# Patient Record
Sex: MALE | Race: WHITE | NOT HISPANIC OR LATINO | Employment: FULL TIME | URBAN - METROPOLITAN AREA
[De-identification: names, ages, dates, MRNs, and addresses within clinical notes are randomized per-mention and may not be internally consistent; named-entity substitution may affect disease eponyms.]

---

## 2017-01-05 ENCOUNTER — TELEPHONE (OUTPATIENT)
Dept: FAMILY MEDICINE | Facility: OTHER | Age: 62
End: 2017-01-05

## 2017-01-05 DIAGNOSIS — F41.9 ANXIETY: Primary | ICD-10-CM

## 2017-01-05 RX ORDER — ALPRAZOLAM 0.5 MG
0.5 TABLET ORAL
Qty: 30 TABLET | Refills: 5 | Status: SHIPPED | OUTPATIENT
Start: 2017-01-05 | End: 2017-04-28

## 2017-01-05 NOTE — TELEPHONE ENCOUNTER
Reason for call:  Medication  Reason for Call:  Medication or medication refill:    Do you use a [facility]?  Name of the pharmacy and phone number for the current request:  [facility] [address] in [place]    Name of the medication requested: ALPRAZolam (XANAX) 0.5 MG tablet    Other request: patient will be out today    Can we leave a detailed message on this number? YES    Phone number patient can be reached at: Home number on file [phone] (home)    Best Time: any    Call taken on 1/5/2017 at 8:33 AM by Sharon Esposito

## 2017-01-05 NOTE — TELEPHONE ENCOUNTER
xanax      Last Written Prescription Date:  7/5/16  Last Fill Quantity: 30,   # refills: 5  Last Office Visit with [facility], [facility] or  Health prescribing provider: 7/5/16  Future Office visit:       Routing refill request to provider for review/approval because:  Drug not on the [facility], [facility] or  Health refill protocol or controlled substance

## 2017-04-27 ENCOUNTER — TELEPHONE (OUTPATIENT)
Dept: FAMILY MEDICINE | Facility: OTHER | Age: 62
End: 2017-04-27

## 2017-04-27 DIAGNOSIS — F41.9 ANXIETY: ICD-10-CM

## 2017-04-27 NOTE — TELEPHONE ENCOUNTER
Alprazolam      Last Written Prescription Date:  01/05/17  Last Fill Quantity: 30,   # refills: 5  Last Office Visit with [facility] or [facility] prescribing provider: 07/05/16  Future Office visit:    Next 5 appointments (look out 90 days)     Jun 19, 2017  9:45 AM CDT   [facility]    [address] [address] [phone]                   Routing refill request to provider for review/approval because:  Drug not on the [facility], [facility] or [facility] refill protocol or controlled substance

## 2017-04-27 NOTE — TELEPHONE ENCOUNTER
Reason for call:  Medication  Reason for Call:  Medication or medication refill:    Do you use a [facility]?  Name of the pharmacy and phone number for the current request:  [facility]    Name of the medication requested: ALPRAZolam (XANAX) 0.5 MG tablet    Other request: Jorgito needs a refill on this medication.  He is taking more then one a day since his last brain surgery.  He is feeling more anxious since that surgery.  He did make appointment with you on 6/19/17.      Can we leave a detailed message on this number? YES    Phone number patient can be reached at: Home number on file [phone] (home)    Best Time: any    Call taken on 4/27/2017 at 3:23 PM by Sharon Esposito

## 2017-04-28 RX ORDER — ALPRAZOLAM 0.5 MG
0.5 TABLET ORAL
Qty: 30 TABLET | Refills: 0 | Status: SHIPPED | OUTPATIENT
Start: 2017-04-28 | End: 2017-06-19

## 2017-04-28 NOTE — TELEPHONE ENCOUNTER
Okay to leave message so informed patient that there is a refill ready for him at his pharmacy. (30 tablets) The pharmacy still had 2 refills for this patient, so they are just using them.   Mike Colvin, CMA

## 2017-06-12 NOTE — PROGRESS NOTES
SUBJECTIVE:                                                    Jorgito Serrano is a 61 year old male who presents to clinic today for the following health issues:      Patient wanting to refill on Xanax and look at upping the dose.  He takes a half a pill about noon and he takes 1 pill at bedtime. Ever since his brain surgery he has been hypersensitive to noise and movement of others.  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  He is very jumpy if somebody walks too close to him . The half a pill works very well.      He is still under the care of neurology at [facility] for his brain tumor.  He has annual brain and spinal column scans. There has been no change. He still has 5-10 seizures per day but this is a great improvement from what it was. His Keppra makes him a bit foggy in the head. His neurologist did not want to increase the dosage or change meds as he has been doing well.    Hyperlipidemia Follow-Up      Rate your low fat/cholesterol diet?: good    Taking statin?  Yes, no muscle aches from statin    Other lipid medications/supplements?:  none       Problem list and histories reviewed & adjusted, as indicated.  Additional history: as documented    BP Readings from Last 3 Encounters:   06/19/17 120/70   07/05/16 100/64   02/02/15 128/90    Wt Readings from Last 3 Encounters:   06/19/17 198 lb 9.6 oz (90.1 kg)   07/05/16 185 lb (83.9 kg)   02/02/15 192 lb 9.6 oz (87.4 kg)                  Labs reviewed in EPIC    Reviewed and updated as needed this visit by clinical staff       Reviewed and updated as needed this visit by Provider         ROS:  C: NEGATIVE for fever, chills, change in weight  E/M: NEGATIVE for ear, mouth and throat problems  R: NEGATIVE for significant cough or SOB  CV: NEGATIVE for chest pain, palpitations or peripheral edema  GI: NEGATIVE for nausea, abdominal pain, heartburn, or change in bowel habits  NEURO: As above  PSYCHIATRIC: NEGATIVE for changes in mood or  "affect    OBJECTIVE:                                                    /70 (BP Location: Left arm, Patient Position: Chair, Cuff Size: Adult Regular)  Pulse 64  Temp 97.6  F (36.4  C) (Oral)  Resp 16  Ht 5' 9.29\" (1.76 m)  Wt 198 lb 9.6 oz (90.1 kg)  BMI 29.08 kg/m2  Body mass index is 29.08 kg/(m^2).  GENERAL: healthy, alert and no distress  NECK: no adenopathy, no asymmetry, masses, or scars and thyroid normal to palpation  RESP: lungs clear to auscultation - no rales, rhonchi or wheezes  CV: regular rate and rhythm, normal S1 S2, no S3 or S4, no murmur, click or rub, no peripheral edema and peripheral pulses strong  ABDOMEN: soft, nontender, no hepatosplenomegaly, no masses and bowel sounds normal  MS: no gross musculoskeletal defects noted, no edema  SKIN: no suspicious lesions or rashes  PSYCH: mentation appears normal, affect normal/bright    Diagnostic Test Results:  No results found for this or any previous visit (from the past 24 hour(s)).     ASSESSMENT/PLAN:                                                            1. Anxiety  Stable, continue current dosing will increase the number of pills to 4560 does not run out every month  - ALPRAZolam (XANAX) 0.5 MG tablet; Take 1 tablet (0.5 mg) by mouth nightly as needed for anxiety  Dispense: 45 tablet; Refill: 5    2. Hyperlipidemia LDL goal <130    - Lipid Profile (Chol, Trig, HDL, LDL calc)  - atorvastatin (LIPITOR) 20 MG tablet; Take 1 tablet (20 mg) by mouth daily 1 Tablet Daily  Dispense: 90 tablet; Refill: 3    3. Ependymoma of brain (H)  Following with [facility]    4. Screening for diabetes mellitus    - Basic metabolic panel    Recheck 6-12 months  Karen Larios PA-C  [facility]  Electronically signed by Karen Larios PA-C   "

## 2017-06-19 ENCOUNTER — OFFICE VISIT (OUTPATIENT)
Dept: FAMILY MEDICINE | Facility: OTHER | Age: 62
End: 2017-06-19
Payer: COMMERCIAL

## 2017-06-19 VITALS
DIASTOLIC BLOOD PRESSURE: 70 MMHG | TEMPERATURE: 97.6 F | HEIGHT: 69 IN | RESPIRATION RATE: 16 BRPM | WEIGHT: 198.6 LBS | SYSTOLIC BLOOD PRESSURE: 120 MMHG | HEART RATE: 64 BPM | BODY MASS INDEX: 29.41 KG/M2

## 2017-06-19 DIAGNOSIS — C71.9 EPENDYMOMA OF BRAIN (H): ICD-10-CM

## 2017-06-19 DIAGNOSIS — E78.5 HYPERLIPIDEMIA LDL GOAL <130: Primary | ICD-10-CM

## 2017-06-19 DIAGNOSIS — F41.9 ANXIETY: ICD-10-CM

## 2017-06-19 DIAGNOSIS — Z13.1 SCREENING FOR DIABETES MELLITUS: ICD-10-CM

## 2017-06-19 LAB
ANION GAP SERPL CALCULATED.3IONS-SCNC: 8 MMOL/L (ref 3–14)
BUN SERPL-MCNC: 13 MG/DL (ref 7–30)
CALCIUM SERPL-MCNC: 9.1 MG/DL (ref 8.5–10.1)
CHLORIDE SERPL-SCNC: 106 MMOL/L (ref 94–109)
CHOLEST SERPL-MCNC: 175 MG/DL
CO2 SERPL-SCNC: 28 MMOL/L (ref 20–32)
CREAT SERPL-MCNC: 0.98 MG/DL (ref 0.66–1.25)
GFR SERPL CREATININE-BSD FRML MDRD: 78 ML/MIN/1.7M2
GLUCOSE SERPL-MCNC: 111 MG/DL (ref 70–99)
HDLC SERPL-MCNC: 34 MG/DL
LDLC SERPL CALC-MCNC: 84 MG/DL
NONHDLC SERPL-MCNC: 141 MG/DL
POTASSIUM SERPL-SCNC: 4.6 MMOL/L (ref 3.4–5.3)
SODIUM SERPL-SCNC: 142 MMOL/L (ref 133–144)
TRIGL SERPL-MCNC: 283 MG/DL

## 2017-06-19 PROCEDURE — 80061 LIPID PANEL: CPT | Performed by: PHYSICIAN ASSISTANT

## 2017-06-19 PROCEDURE — 99213 OFFICE O/P EST LOW 20 MIN: CPT | Performed by: PHYSICIAN ASSISTANT

## 2017-06-19 PROCEDURE — 80048 BASIC METABOLIC PNL TOTAL CA: CPT | Performed by: PHYSICIAN ASSISTANT

## 2017-06-19 PROCEDURE — 36415 COLL VENOUS BLD VENIPUNCTURE: CPT | Performed by: PHYSICIAN ASSISTANT

## 2017-06-19 RX ORDER — ALPRAZOLAM 0.5 MG
0.5 TABLET ORAL
Qty: 45 TABLET | Refills: 5 | Status: SHIPPED | OUTPATIENT
Start: 2017-06-19 | End: 2017-10-02

## 2017-06-19 RX ORDER — ATORVASTATIN CALCIUM 20 MG/1
20 TABLET, FILM COATED ORAL DAILY
Qty: 90 TABLET | Refills: 3 | Status: SHIPPED | OUTPATIENT
Start: 2017-06-19

## 2017-06-19 ASSESSMENT — ANXIETY QUESTIONNAIRES
5. BEING SO RESTLESS THAT IT IS HARD TO SIT STILL: SEVERAL DAYS
2. NOT BEING ABLE TO STOP OR CONTROL WORRYING: NOT AT ALL
GAD7 TOTAL SCORE: 4
1. FEELING NERVOUS, ANXIOUS, OR ON EDGE: SEVERAL DAYS
7. FEELING AFRAID AS IF SOMETHING AWFUL MIGHT HAPPEN: NOT AT ALL
3. WORRYING TOO MUCH ABOUT DIFFERENT THINGS: NOT AT ALL
IF YOU CHECKED OFF ANY PROBLEMS ON THIS QUESTIONNAIRE, HOW DIFFICULT HAVE THESE PROBLEMS MADE IT FOR YOU TO DO YOUR WORK, TAKE CARE OF THINGS AT HOME, OR GET ALONG WITH OTHER PEOPLE: SOMEWHAT DIFFICULT
6. BECOMING EASILY ANNOYED OR IRRITABLE: SEVERAL DAYS

## 2017-06-19 ASSESSMENT — PATIENT HEALTH QUESTIONNAIRE - PHQ9: 5. POOR APPETITE OR OVEREATING: SEVERAL DAYS

## 2017-06-19 NOTE — NURSING NOTE
"Chief Complaint   Patient presents with     RECHECK     Panel Management     phq9/ryley, hep c, ldl       Initial /70 (BP Location: Left arm, Patient Position: Chair, Cuff Size: Adult Regular)  Pulse 64  Temp 97.6  F (36.4  C) (Oral)  Resp 16  Ht 5' 9.29\" (1.76 m)  Wt 198 lb 9.6 oz (90.1 kg)  BMI 29.08 kg/m2 Estimated body mass index is 29.08 kg/(m^2) as calculated from the following:    Height as of this encounter: 5' 9.29\" (1.76 m).    Weight as of this encounter: 198 lb 9.6 oz (90.1 kg).  Medication Reconciliation: complete     Cinthia Brown, [facility]  June 19, 2017      "

## 2017-06-19 NOTE — MR AVS SNAPSHOT
"              After Visit Summary   6/19/2017    Jorgito Serrano    MRN: [id number]           Patient Information     Date Of Birth          1955        Visit Information        Provider Department      6/19/2017 9:45 AM [facility]C [facility]        Today's Diagnoses     Hyperlipidemia LDL goal <130    -  1    Anxiety        Ependymoma of brain (H)        Screening for diabetes mellitus           Follow-ups after your visit        Who to contact     If you have questions or need follow up information about today's clinic visit or your schedule please contact [facility] directly at [phone].  Normal or non-critical lab and imaging results will be communicated to you by Spacebikinihart, letter or phone within 4 business days after the clinic has received the results. If you do not hear from us within 7 days, please contact the clinic through Spacebikinihart or phone. If you have a critical or abnormal lab result, we will notify you by phone as soon as possible.  Submit refill requests through Colto or call your pharmacy and they will forward the refill request to us. Please allow 3 business days for your refill to be completed.          Additional Information About Your Visit        MyChart Information     Colto gives you secure access to your electronic health record. If you see a primary care provider, you can also send messages to your care team and make appointments. If you have questions, please call your primary care clinic.  If you do not have a primary care provider, please call [phone] and they will assist you.        Care EveryWhere ID     This is your Care EveryWhere ID. This could be used by other organizations to access your [facility] medical records  [id number]        Your Vitals Were     Pulse Temperature Respirations Height BMI (Body Mass Index)       64 97.6  F (36.4  C) (Oral) 16 5' 9.29\" (1.76 m) 29.08 kg/m2        Blood Pressure from Last 3 Encounters: "   06/19/17 120/70   07/05/16 100/64   02/02/15 128/90    Weight from Last 3 Encounters:   06/19/17 198 lb 9.6 oz (90.1 kg)   07/05/16 185 lb (83.9 kg)   02/02/15 192 lb 9.6 oz (87.4 kg)              We Performed the Following     Basic metabolic panel     Lipid Profile (Chol, Trig, HDL, LDL calc)          Where to get your medicines      These medications were sent to [facility] [facility] [place] - [address]  [address] [place] [address]     Phone:  [phone]     atorvastatin 20 MG tablet         Some of these will need a paper prescription and others can be bought over the counter.  Ask your nurse if you have questions.     Bring a paper prescription for each of these medications     ALPRAZolam 0.5 MG tablet          Primary Care Provider Office Phone # Fax #    Karen Larios PA-C [phone]       [facility] [address] [place] [address]        Thank you!     Thank you for choosing [facility]  for your care. Our goal is always to provide you with excellent care. Hearing back from our patients is one way we can continue to improve our services. Please take a few minutes to complete the written survey that you may receive in the mail after your visit with us. Thank you!             Your Updated Medication List - Protect others around you: Learn how to safely use, store and throw away your medicines at www.disposemymeds.org.          This list is accurate as of: 6/19/17 10:15 AM.  Always use your most recent med list.                   Brand Name Dispense Instructions for use    ALPRAZolam 0.5 MG tablet    XANAX    45 tablet    Take 1 tablet (0.5 mg) by mouth nightly as needed for anxiety       atorvastatin 20 MG tablet    LIPITOR    90 tablet    Take 1 tablet (20 mg) by mouth daily 1 Tablet Daily       levETIRAcetam 500 MG 24 hr tablet    KEPPRA XR     Take 1,000 mg by mouth daily

## 2017-06-20 ASSESSMENT — ANXIETY QUESTIONNAIRES: GAD7 TOTAL SCORE: 4

## 2017-06-20 ASSESSMENT — PATIENT HEALTH QUESTIONNAIRE - PHQ9: SUM OF ALL RESPONSES TO PHQ QUESTIONS 1-9: 2

## 2017-08-23 ENCOUNTER — OFFICE VISIT (OUTPATIENT)
Dept: FAMILY MEDICINE | Facility: CLINIC | Age: 62
End: 2017-08-23
Payer: COMMERCIAL

## 2017-08-23 VITALS
HEIGHT: 69 IN | OXYGEN SATURATION: 100 % | TEMPERATURE: 97.1 F | WEIGHT: 195 LBS | SYSTOLIC BLOOD PRESSURE: 107 MMHG | HEART RATE: 89 BPM | DIASTOLIC BLOOD PRESSURE: 63 MMHG | BODY MASS INDEX: 28.88 KG/M2

## 2017-08-23 DIAGNOSIS — M25.512 CHRONIC LEFT SHOULDER PAIN: Primary | ICD-10-CM

## 2017-08-23 DIAGNOSIS — G89.29 CHRONIC RIGHT HIP PAIN: ICD-10-CM

## 2017-08-23 DIAGNOSIS — G89.29 CHRONIC LEFT SHOULDER PAIN: Primary | ICD-10-CM

## 2017-08-23 DIAGNOSIS — M25.551 CHRONIC RIGHT HIP PAIN: ICD-10-CM

## 2017-08-23 PROCEDURE — 99214 OFFICE O/P EST MOD 30 MIN: CPT | Performed by: INTERNAL MEDICINE

## 2017-08-23 ASSESSMENT — ENCOUNTER SYMPTOMS
FOCAL WEAKNESS: 0
MYALGIAS: 0
SENSORY CHANGE: 0

## 2017-08-23 NOTE — PATIENT INSTRUCTIONS
Proceed with physical therapy for your left shoulder and right hip pain.    May take Aleve as needed.    Call doctor if your pain persist/worsens, or if you develop new symptoms.

## 2017-08-23 NOTE — PROGRESS NOTES
"Musculoskeletal Problem   This is a chronic problem. Episode onset: 6-8 months at left shoulder; 4-6 months at right hip. The problem occurs daily. The problem has been gradually worsening. Pertinent negatives include no myalgias. Exacerbated by: shoulder -- abduction and extension of left upper extremity; hip -- standing up from the sitting position. He has tried NSAIDs for the symptoms. The treatment provided moderate relief.       Past Medical History:   Diagnosis Date     Cerebellar mass      Depressive disorder, not elsewhere classified      Insomnia      Meningitis, bacterial      Migraine headache      Pure hypercholesterolemia        Review of Systems   Musculoskeletal: Negative for myalgias.   Neurological: Negative for sensory change and focal weakness.       /63  Pulse 89  Temp 97.1  F (36.2  C)  Ht 5' 9\" (1.753 m)  Wt 195 lb (88.5 kg)  SpO2 100%  BMI 28.8 kg/m2      Physical Exam   Constitutional: He is oriented to person, place, and time. No distress.   Neck: Normal range of motion. Neck supple.   Musculoskeletal: He exhibits tenderness (none elicited at left shoulder on palpation and passive/active movement; positive at the lateral aspect of the right hip on flexion and internal/external rotation of right leg at the hip joint).   Neurological: He is alert and oriented to person, place, and time. He has normal reflexes. Gait normal. GCS score is 15.   Intact motor and sensory function at the left upper extremity and right lower extremity   Skin: No erythema.   Vitals reviewed.        ICD-10-CM    1. Chronic left shoulder pain M25.512 [facility]    G89.29    2. Chronic right hip pain M25.551 [facility]    G89.29      **please refer to HPI for status of conditions      Patient Instructions   Proceed with physical therapy for your left shoulder and right hip pain.    May take Aleve as needed.    Call doctor if your pain persist/worsens, " or if you develop new symptoms.

## 2017-08-23 NOTE — MR AVS SNAPSHOT
After Visit Summary   8/23/2017    Jorgito Serrano    MRN: [id number]           Patient Information     Date Of Birth          1955        Visit Information        Provider Department      8/23/2017 2:30 PM Jalen Acevedo MD [facility]        Today's Diagnoses     Chronic left shoulder pain    -  1    Chronic right hip pain          Care Instructions    Proceed with physical therapy for your left shoulder and right hip pain.    May take Aleve as needed.    Call doctor if your pain persist/worsens, or if you develop new symptoms.            Follow-ups after your visit        Additional Services     [facility]       **This order will print in the [facility] Scheduling Office**    Physical Therapy, Hand Therapy and Chiropractic Care are available through:    *[facility]  *[facility]  *[facility]    Call one number to schedule at any of the above locations: [phone].    Your provider has referred you to: Physical Therapy at [facility] or [facility]    Indication/Reason for Referral: Hip Pain and Shoulder Pain  Onset of Illness: months  Therapy Orders: Evaluate and Treat  Special Programs: as recommended by therapist  Special Request:     Julius Madrid      Additional Comments for the Therapist or Chiropractor:     Please be aware that coverage of these services is subject to the terms and limitations of your health insurance plan.  Call member services at your health plan with any benefit or coverage questions.      Please bring the following to your appointment:    *Your personal calendar for scheduling future appointments  *Comfortable clothing                  Who to contact     If you have questions or need follow up information about today's clinic visit or your schedule please contact [facility] [place] directly at [phone].  Normal or non-critical lab and imaging results will be  "communicated to you by Total Attorneyshart, letter or phone within 4 business days after the clinic has received the results. If you do not hear from us within 7 days, please contact the clinic through BuzzFeed or phone. If you have a critical or abnormal lab result, we will notify you by phone as soon as possible.  Submit refill requests through BuzzFeed or call your pharmacy and they will forward the refill request to us. Please allow 3 business days for your refill to be completed.          Additional Information About Your Visit        BuzzFeed Information     BuzzFeed gives you secure access to your electronic health record. If you see a primary care provider, you can also send messages to your care team and make appointments. If you have questions, please call your primary care clinic.  If you do not have a primary care provider, please call [phone] and they will assist you.        Care EveryWhere ID     This is your Care EveryWhere ID. This could be used by other organizations to access your [facility] medical records  [id number]        Your Vitals Were     Pulse Temperature Height Pulse Oximetry BMI (Body Mass Index)       89 97.1  F (36.2  C) 5' 9\" (1.753 m) 100% 28.8 kg/m2        Blood Pressure from Last 3 Encounters:   08/23/17 107/63   06/19/17 120/70   07/05/16 100/64    Weight from Last 3 Encounters:   08/23/17 195 lb (88.5 kg)   06/19/17 198 lb 9.6 oz (90.1 kg)   07/05/16 185 lb (83.9 kg)              We Performed the Following     [facility]        Primary Care Provider Office Phone # Fax #    Karen Larios PA-C [phone] [address] [place] [address]        Equal Access to Services     [facility] : [place], bridgette roberts, andi kaalmauyen simon, carroll walter. So [facility] [phone].    ATENCIÓN: Si habla español, tiene a jurado disposición servicios gratuitos de asistencia lingüística. Llame al " [phone].    We comply with applicable federal civil rights laws and [place] laws. We do not discriminate on the basis of race, color, national origin, age, disability sex, sexual orientation or gender identity.            Thank you!     Thank you for choosing [facility]  for your care. Our goal is always to provide you with excellent care. Hearing back from our patients is one way we can continue to improve our services. Please take a few minutes to complete the written survey that you may receive in the mail after your visit with us. Thank you!             Your Updated Medication List - Protect others around you: Learn how to safely use, store and throw away your medicines at www.disposemymeds.org.          This list is accurate as of: 8/23/17  2:54 PM.  Always use your most recent med list.                   Brand Name Dispense Instructions for use Diagnosis    ALPRAZolam 0.5 MG tablet    XANAX    45 tablet    Take 1 tablet (0.5 mg) by mouth nightly as needed for anxiety    Anxiety       atorvastatin 20 MG tablet    LIPITOR    90 tablet    Take 1 tablet (20 mg) by mouth daily 1 Tablet Daily    Hyperlipidemia LDL goal <130       levETIRAcetam 500 MG 24 hr tablet    KEPPRA XR     Take 1,000 mg by mouth daily

## 2017-09-01 ENCOUNTER — THERAPY VISIT (OUTPATIENT)
Dept: PHYSICAL THERAPY | Facility: CLINIC | Age: 62
End: 2017-09-01
Payer: COMMERCIAL

## 2017-09-01 DIAGNOSIS — M25.552 HIP PAIN, LEFT: ICD-10-CM

## 2017-09-01 DIAGNOSIS — M25.562 KNEE PAIN, LEFT: Primary | ICD-10-CM

## 2017-09-01 DIAGNOSIS — M25.511 SHOULDER PAIN, RIGHT: ICD-10-CM

## 2017-09-01 PROCEDURE — 97161 PT EVAL LOW COMPLEX 20 MIN: CPT | Mod: GP | Performed by: PHYSICAL THERAPIST

## 2017-09-01 PROCEDURE — 97110 THERAPEUTIC EXERCISES: CPT | Mod: GP | Performed by: PHYSICAL THERAPIST

## 2017-09-01 ASSESSMENT — ACTIVITIES OF DAILY LIVING (ADL)
STEPPING_UP_AND_DOWN_CURBS: EXTREME DIFFICULTY
LIGHT_TO_MODERATE_WORK: EXTREME DIFFICULTY
ROLLING_OVER_IN_BED: MODERATE DIFFICULTY
GETTING_INTO_AND_OUT_OF_AN_AVERAGE_CAR: EXTREME DIFFICULTY
WALKING_15_MINUTES_OR_GREATER: MODERATE DIFFICULTY
PUTTING_ON_SOCKS_AND_SHOES: MODERATE DIFFICULTY
RECREATIONAL_ACTIVITIES: EXTREME DIFFICULTY
HOW_WOULD_YOU_RATE_YOUR_CURRENT_LEVEL_OF_FUNCTION_DURING_YOUR_USUAL_ACTIVITIES_OF_DAILY_LIVING_FROM_0_TO_100_WITH_100_BEING_YOUR_LEVEL_OF_FUNCTION_PRIOR_TO_YOUR_HIP_PROBLEM_AND_0_BEING_THE_INABILITY_TO_PERFORM_ANY_OF_YOUR_USUAL_DAILY_ACTIVITIES?: 20
WALKING_UP_STEEP_HILLS: MODERATE DIFFICULTY
HOS_ADL_SCORE(%): 29.69
DEEP_SQUATTING: EXTREME DIFFICULTY
TWISTING/PIVOTING_ON_INVOLVED_LEG: EXTREME DIFFICULTY
HEAVY_WORK: UNABLE TO DO
WALKING_DOWN_STEEP_HILLS: MODERATE DIFFICULTY
GOING_UP_1_FLIGHT_OF_STAIRS: EXTREME DIFFICULTY
WALKING_APPROXIMATELY_10_MINUTES: MODERATE DIFFICULTY
HOS_ADL_ITEM_SCORE_TOTAL: 19
HOS_ADL_COUNT: 16
STANDING_FOR_15_MINUTES: EXTREME DIFFICULTY
HOS_ADL_HIGHEST_POTENTIAL_SCORE: 64
WALKING_INITIALLY: EXTREME DIFFICULTY
SITTING_FOR_15_MINUTES: UNABLE TO DO
GOING_DOWN_1_FLIGHT_OF_STAIRS: UNABLE TO DO

## 2017-09-01 NOTE — PROGRESS NOTES
Subjective:    [facility]   Physical Therapy Initial Evaluation  September 1, 2017     Precautions/Restrictions/MD instructions: PT eval and treat.     Subjective:   Date of Onset: 2-3 weeks ago  C/C:right hip pain and left shoulder pain  Quality of pain is burning and sharp occasionally giving out.  Pains are described as intermittent in nature. Pain is worse: constant. Pain is rated 9/10.   History of symptoms: Pains began suddenly as the result of unknown origins. Since onset, symptoms are worsening.  Uses FWW and cane for community ambulation  Worsened by: sitting, standing, sleeping    Alleviated by: Walking.  Ice, heat, tns unit  General health as reported by patient: good  Pertinent medical/surgical history: History of cancer brain tumor with yearly PET scans, depression, seizures, migraines/head aches, smoking, pain at rest/night, and numbness and tingling. HE denies night pain, painful cough, painful peripheral motor deficit, recent bowel/bladder change, recent vision change, ringing in the ears or pain with swallowing, significant current illness or recent hospital admission. He denies any regional implanted devices.    Imaging: {Special tests initial hx:721090}. Current occupational status:  full time. Difficulty sleeping to concentrate  Patient's goals are: decrease pain, riding a recumbent bike, sitting to play with grandson . Return to MD:  PRN    Therapist Impression:   Jorgito Serrano is a 61 year old male with *** history of ***. {He/She:044896} presents with signs and symptoms consistent with ***. These impairments limit {GENDER_POSSESSIVE_ADJECTIVE:231338} ability to ***. Skilled PT services are necessary in order to reduce impairments and improve independent function.    Objective:  KNEE:    PROM:   L  R   Hyperextension     Extension     Flexion       Strength:   L R   HIP     Flex     Ext     Abd     KNEE     Flex     Ext       Hip PROM:     L R   IR     ER     Gavino Higginbotham    "      Special tests:   L R   Anterior Drawer     Posterior Drawer     Lachman's     Valgus 0 degrees     Valgus 30 degrees     Varus 0 degrees     Varus 30 degrees     Katerina's     Appley's     Lateral Compression     Patellar Compression         Palpation: ***    Patellar tracking: ***    Functional: ***    Gait: ***              Assessment/Plan:    The patient is a 61 year old male with chief complaint of ***.    The patient has the following significant findings with corresponding treatment plan.  Diagnosis 1:  ***    {REHAB LIMITATIONS/TREATMENT PLAN:893008}    Diagnosis 2:  ***     {REHAB LIMITATIONS/TREATMENT PLAN:338252}      Therapy Evaluation Codes:   1) History comprised of:   Personal factors that impact the plan of care:      {Personal factors impacting care:973451}.    Comorbidity factors that impact the plan of care are:      {Comorbidities impacting care:220059}.     Medications impacting care: {Medications Impacting care:042454}.  2) Examination of Body Systems comprised of:   Body structures and functions that impact the plan of care:      {Body Structures and functions:086634}.   Activity limitations that impact the plan of care are:      {Activity/participation limitations or restrictions:745272}.   Clinical presentation characteristics are:    {Clinical presentation characteristics:948968}.  3) Presentation comprised of:   {Presentation characteristics:437170}.  4) Decision-Making    {Decision Making Low/Moderate/High:862843}  Cumulative Therapy Evaluation is: {Low/Moderate/High/Re-evaluation complexity:932980}.    Previous and current functional limitations:  (See Goal Flow Sheet for this information)    Short term and Long term goals: (See Goal Flow Sheet for this information)     Communication ability:  {COMMUNICATION ABILITY:971648::\"Patient appears to be able to clearly communicate and understand verbal and written communication and follow directions correctly.\"}  Treatment Explanation - " The following has been discussed with the patient: RX ordered/plan of care, anticipated outcomes, and possible risks and side effects.  This patient would benefit from PT intervention to resume normal activities.   Rehab potential is {REHAB POTENTIAL/PROGNOSIS:259964}.    Frequency:  {FREQUENCY OF TREATMENT:223786}  Duration:  {DURATION OF TREATMENT:756909}  Discharge Plan: Achieve all LTGs, be independent in home treatment program, and reach maximal therapeutic benefit.    Please refer to the daily flowsheet for treatment today, total treatment time and time spent performing 1:1 timed codes.                       Objective:    System    Physical Exam    General     ROS    Assessment/Plan:      {REHAB NOTES:762843}

## 2017-09-03 NOTE — PROGRESS NOTES
Subjective:    [facility]   Physical Therapy Initial Evaluation  September 1, 2017     Precautions/Restrictions/MD instructions: PT eval and treat.     Subjective:   Date of Onset: 2-3 weeks ago  C/C:right lateral hip pain, right knee pain at distal quadriceps tendon, and left shoulder pain  Quality of pain is burning and sharp occasionally giving out.  Pains are described as intermittent in nature. Pain is worse: constant. Pain is rated 9/10. He reports 2 falls since onset of symptoms with no further injury.  History of symptoms: Pains began suddenly as the result of unknown origins. Since onset, symptoms are worsening.  Uses FWW and cane for community ambulation  Worsened by: sitting, standing, sleeping    Alleviated by: Walking.  Ice, heat, tens unit  General health as reported by patient: good  Pertinent medical/surgical history: History of cancer brain tumor with yearly PET scans, depression, seizures, migraines/head aches, smoking, pain at rest/night, and numbness and tingling. HE denies night pain, painful cough, painful peripheral motor deficit, recent bowel/bladder change, recent vision change, ringing in the ears or pain with swallowing, significant current illness or recent hospital admission. He denies any regional implanted devices.    Imaging: none. Current occupational status:  full time.  Patient's goals are: decrease pain, riding a recumbent bike, improve sleep quality, be able to sit on the ground to play with 6 month old grandson . Return to MD:  PRN    Therapist Impression:   Jorgito Serrano is a 61 year old male with right lateral hip pain, knee pain, and left shoulder pain history of cancer, decreased proprioception in B ankles, and falls. He presents with signs and symptoms consistent with patellar femoral pain syndrome, hip dysfunction, and elbow pain. These impairments limit his ability to sleep, ambulate without and assistive device, play with young grandchild, perform work duties,  "perform regular exercise routine and place him at an increased risk for falling. Skilled PT services are necessary in order to reduce impairments and improve independent function.    Objective:  KNEE:    PROM:   L  R   Extension 5 degrees hyper extension Lacking 2 degrees from full extension   Flexion 125 120 pain     Strength:   L R   HIP     Flex 4/5 4/5   Ext 4/5 3/5 pain   Abd 4/5 4/5   KNEE     Flex 4+/5 5/5   Ext 5/5 4+/5 pain     Hip PROM:     L R   IR 25 10 pain   ER 65 65   Ezra's negative positive   Prone Rectus Moderately decreased Moderate decrease with pain       Special tests:   L R   Lachman's negative negative   Valgus 0 degrees negative negative   Valgus 30 degrees negative negative   Varus 0 degrees negative negative   Varus 30 degrees negative negative   Katerina's negative negative   Appley's negative negative   Lateral Compression negative Minimal tenderness    Patellar Compression negative Min tenderness       Palpation: tender to palpation at distal quadriceps tendon, medial and lateral posterior patellar border      Functional: Squat: moderate pain at R patella, dynamic valgus noted and heavy use of UE required to performi  Balance: Narrow base of support 30\", B tandem stance up to 30\" each with     Gait: Ambulates with SPC in R UE with decreased stance time on R and early heel off on R. Compensated [place] noted during R stance time. Improved with use of SPC in L UE.    Left shoulder to be assessed at next visit.               Assessment/Plan:    The patient is a 61 year old male with chief complaint of right sided hip, and knee pain, antalgic gait pattern, history of recent falls, impaired balance, and left shoulder pain.    The patient has the following significant findings with corresponding treatment plan.  Diagnosis 1: Right sided Patellar femoral pain syndrome  Pain -  hot/cold therapy, US, electric stimulation, manual therapy, self management, education and home " program  Decreased ROM/flexibility - manual therapy and therapeutic exercise  Decreased joint mobility - manual therapy and therapeutic exercise  Decreased strength - therapeutic exercise and therapeutic activities  Impaired balance - neuro re-education and therapeutic activities  Impaired gait - gait training  Impaired muscle performance - neuro re-education  Decreased function - therapeutic activities    Diagnosis 2: Left sided shoulder pain  Pain -  hot/cold therapy, US and mechanical traction  Decreased ROM/flexibility - manual therapy and therapeutic exercise  Impaired muscle performance - neuro re-education  Impaired posture - neuro re-education      Therapy Evaluation Codes:   1) History comprised of:   Personal factors that impact the plan of care:      Past/current experiences.    Comorbidity factors that impact the plan of care are:      Cancer, Depression, Overweight, Seizures, Smoking, Weakness and recent falls.     Medications impacting care: High blood pressure.  2) Examination of Body Systems comprised of:   Body structures and functions that impact the plan of care:      Ankle, Hip, Knee and Shoulder.   Activity limitations that impact the plan of care are:      Bathing, Bending, Cooking, Driving, Dressing, Reading/Computer work, Running, Sitting, Squatting/kneeling, Stairs, Walking and Sleeping.   Clinical presentation characteristics are:    Stable/Uncomplicated.  3) Presentation comprised of:   Presentation scored as Low complexity with uncomplicated characteristics..  4) Decision-Making    Low complexity using standardized patient assessment instrument and/or measureable assessment of functional outcome.  Cumulative Therapy Evaluation is: Low complexity.    Previous and current functional limitations:  (See Goal Flow Sheet for this information)    Short term and Long term goals: (See Goal Flow Sheet for this information)     Communication ability:  Patient appears to be able to clearly  communicate and understand verbal and written communication and follow directions correctly.  Treatment Explanation - The following has been discussed with the patient: RX ordered/plan of care, anticipated outcomes, and possible risks and side effects.  This patient would benefit from PT intervention to resume normal activities.   Rehab potential is fair.    Frequency:  1 X week, once daily  Duration:  for 10 weeks  Discharge Plan: Achieve all LTGs, be independent in home treatment program, and reach maximal therapeutic benefit.    Please refer to the daily flowsheet for treatment today, total treatment time and time spent performing 1:1 timed codes.                       Objective:    System    Physical Exam    General     ROS

## 2017-09-08 ENCOUNTER — THERAPY VISIT (OUTPATIENT)
Dept: PHYSICAL THERAPY | Facility: CLINIC | Age: 62
End: 2017-09-08
Payer: COMMERCIAL

## 2017-09-08 DIAGNOSIS — M25.562 KNEE PAIN, LEFT: ICD-10-CM

## 2017-09-08 DIAGNOSIS — M25.511 SHOULDER PAIN, RIGHT: Primary | ICD-10-CM

## 2017-09-08 DIAGNOSIS — M25.552 HIP PAIN, LEFT: ICD-10-CM

## 2017-09-08 PROCEDURE — 97140 MANUAL THERAPY 1/> REGIONS: CPT | Mod: GP | Performed by: PHYSICAL THERAPIST

## 2017-09-08 PROCEDURE — 97112 NEUROMUSCULAR REEDUCATION: CPT | Mod: GP | Performed by: PHYSICAL THERAPIST

## 2017-09-08 PROCEDURE — 97110 THERAPEUTIC EXERCISES: CPT | Mod: GP | Performed by: PHYSICAL THERAPIST

## 2017-09-15 ENCOUNTER — OFFICE VISIT (OUTPATIENT)
Dept: FAMILY MEDICINE | Facility: CLINIC | Age: 62
End: 2017-09-15
Payer: COMMERCIAL

## 2017-09-15 ENCOUNTER — THERAPY VISIT (OUTPATIENT)
Dept: PHYSICAL THERAPY | Facility: CLINIC | Age: 62
End: 2017-09-15
Payer: COMMERCIAL

## 2017-09-15 VITALS
HEIGHT: 69 IN | DIASTOLIC BLOOD PRESSURE: 80 MMHG | SYSTOLIC BLOOD PRESSURE: 124 MMHG | WEIGHT: 196.8 LBS | OXYGEN SATURATION: 98 % | BODY MASS INDEX: 29.15 KG/M2 | HEART RATE: 68 BPM | TEMPERATURE: 97.8 F

## 2017-09-15 DIAGNOSIS — M25.552 HIP PAIN, BILATERAL: Primary | ICD-10-CM

## 2017-09-15 DIAGNOSIS — M25.551 HIP PAIN, BILATERAL: Primary | ICD-10-CM

## 2017-09-15 DIAGNOSIS — M25.552 HIP PAIN, LEFT: ICD-10-CM

## 2017-09-15 DIAGNOSIS — M25.562 KNEE PAIN, LEFT: ICD-10-CM

## 2017-09-15 DIAGNOSIS — M25.511 SHOULDER PAIN, RIGHT: ICD-10-CM

## 2017-09-15 PROCEDURE — 99213 OFFICE O/P EST LOW 20 MIN: CPT | Performed by: INTERNAL MEDICINE

## 2017-09-15 PROCEDURE — 97110 THERAPEUTIC EXERCISES: CPT | Mod: GP | Performed by: PHYSICAL THERAPIST

## 2017-09-15 PROCEDURE — 97112 NEUROMUSCULAR REEDUCATION: CPT | Mod: GP | Performed by: PHYSICAL THERAPIST

## 2017-09-15 NOTE — NURSING NOTE
"Chief Complaint   Patient presents with     Trouble walking     Fall        Initial /80 (BP Location: Right arm, Patient Position: Chair, Cuff Size: Adult Regular)  Pulse 68  Temp 97.8  F (36.6  C) (Oral)  Ht 5' 9\" (1.753 m)  Wt 196 lb 12.8 oz (89.3 kg)  SpO2 98%  BMI 29.06 kg/m2 Estimated body mass index is 29.06 kg/(m^2) as calculated from the following:    Height as of this encounter: 5' 9\" (1.753 m).    Weight as of this encounter: 196 lb 12.8 oz (89.3 kg)..    BP completed using cuff size: regular  MEDICATIONS REVIEWED  SOCIAL AND FAMILY HX REVIEWED  Fanta Chapman CMA  "

## 2017-09-15 NOTE — MR AVS SNAPSHOT
After Visit Summary   9/15/2017    Jorgito Serrano    MRN: [id number]           Patient Information     Date Of Birth          1955        Visit Information        Provider Department      9/15/2017 4:30 PM Jalen Acevedo MD [facility]        Today's Diagnoses     Hip pain, bilateral    -  1      Care Instructions    Follow up with Orthopedics.          Follow-ups after your visit        Additional Services     ORTHOPEDICS ADULT REFERRAL       Your provider has referred you to: [facility] [place] [phone]   [email or web address]    Please be aware that coverage of these services is subject to the terms and limitations of your health insurance plan.  Call member services at your health plan with any benefit or coverage questions.      Please bring the following to your appointment:    >>   Any x-rays, CTs or MRIs which have been performed.  Contact the facility where they were done to arrange for  prior to your scheduled appointment.    >>   List of current medications   >>   This referral request   >>   Any documents/labs given to you for this referral            ORTHOPEDICS ADULT REFERRAL       Your provider has referred you to:     Please be aware that coverage of these services is subject to the terms and limitations of your health insurance plan.  Call member services at your health plan with any benefit or coverage questions.      Please bring the following to your appointment:    >>   Any x-rays, CTs or MRIs which have been performed.  Contact the facility where they were done to arrange for  prior to your scheduled appointment.    >>   List of current medications   >>   This referral request   >>   Any documents/labs given to you for this referral                  Your next 10 appointments already scheduled     Sep 18, 2017  3:10 PM CDT   PHAM Extremity with Loretta Ingram PT   [place] [facility] ([facility]  )    [address]  [place] [address]   [phone]            Sep 28, 2017  3:10 PM CDT   PHAM Extremity with Loretta Ingram PT   [place] [facility] ([facility] [place]  )    [address] [place] [address] [address]  [place] [address]   [phone]            Oct 05, 2017  3:10 PM CDT   PHAM Extremity with Loretta Ingram PT   [facility] ([facility] [place]  )    [address] [place] [address] [address]  [place] [address]   [phone]            Oct 12, 2017  3:10 PM CDT   PHAM Extremity with Loretta Ingram PT   [facility] ([facility] [place]  )    [address] [place] [address] [address]  [place] [address]   [phone]              Who to contact     If you have questions or need follow up information about today's clinic visit or your schedule please contact [facility] directly at [phone].  Normal or non-critical lab and imaging results will be communicated to you by FlowPayhart, letter or phone within 4 business days after the clinic has received the results. If you do not hear from us within 7 days, please contact the clinic through Sciencet or phone. If you have a critical or abnormal lab result, we will notify you by phone as soon as possible.  Submit refill requests through [employer] or call your pharmacy and they will forward the refill request to us. Please allow 3 business days for your refill to be completed.          Additional Information About Your Visit        FlowPayharAnytime Fitness Information     [employer] Software gives you secure access to your electronic health record. If you see a primary care provider, you can also send messages to your care team and make appointments. If you have questions, please call your primary care clinic.  If you do not have a primary care provider, please call [phone] and they will assist you.        Care EveryWhere ID     This is your Care  "EveryWhere ID. This could be used by other organizations to access your [facility] medical records  [id number]        Your Vitals Were     Pulse Temperature Height Pulse Oximetry BMI (Body Mass Index)       68 97.8  F (36.6  C) (Oral) 5' 9\" (1.753 m) 98% 29.06 kg/m2        Blood Pressure from Last 3 Encounters:   09/15/17 124/80   08/23/17 107/63   06/19/17 120/70    Weight from Last 3 Encounters:   09/15/17 196 lb 12.8 oz (89.3 kg)   08/23/17 195 lb (88.5 kg)   06/19/17 198 lb 9.6 oz (90.1 kg)              We Performed the Following     ORTHOPEDICS ADULT REFERRAL     ORTHOPEDICS ADULT REFERRAL        Primary Care Provider Office Phone # Fax #    Karen Larios PA-C [phone] [address] [place] [address]        Equal Access to Services     THEO SINGLETARY : Hadii aad ku hadasho Soomaali, waaxda luqadaha, qaybta kaalmada adeegyada, waxay vitaliyin haychey hassan . So [facility] [phone].    ATENCIÓN: Si amandeep reinoso, tiene a jurado disposición servicios gratuitos de asistencia lingüística. Llame al [phone].    We comply with applicable federal civil rights laws and [place] laws. We do not discriminate on the basis of race, color, national origin, age, disability sex, sexual orientation or gender identity.            Thank you!     Thank you for choosing [facility]  for your care. Our goal is always to provide you with excellent care. Hearing back from our patients is one way we can continue to improve our services. Please take a few minutes to complete the written survey that you may receive in the mail after your visit with us. Thank you!             Your Updated Medication List - Protect others around you: Learn how to safely use, store and throw away your medicines at www.disposemymeds.org.          This list is accurate as of: 9/15/17  4:34 PM.  Always use your most recent med list.                   Brand Name Dispense Instructions for use Diagnosis    " ALPRAZolam 0.5 MG tablet    XANAX    45 tablet    Take 1 tablet (0.5 mg) by mouth nightly as needed for anxiety    Anxiety       atorvastatin 20 MG tablet    LIPITOR    90 tablet    Take 1 tablet (20 mg) by mouth daily 1 Tablet Daily    Hyperlipidemia LDL goal <130       levETIRAcetam 500 MG 24 hr tablet    KEPPRA XR     Take 1,000 mg by mouth daily

## 2017-09-18 ENCOUNTER — THERAPY VISIT (OUTPATIENT)
Dept: PHYSICAL THERAPY | Facility: CLINIC | Age: 62
End: 2017-09-18
Payer: COMMERCIAL

## 2017-09-18 DIAGNOSIS — M25.552 HIP PAIN, LEFT: ICD-10-CM

## 2017-09-18 DIAGNOSIS — M25.511 SHOULDER PAIN, RIGHT: ICD-10-CM

## 2017-09-18 DIAGNOSIS — M25.562 KNEE PAIN, LEFT: ICD-10-CM

## 2017-09-18 PROCEDURE — 97110 THERAPEUTIC EXERCISES: CPT | Mod: GP | Performed by: PHYSICAL THERAPIST

## 2017-09-18 PROCEDURE — 97112 NEUROMUSCULAR REEDUCATION: CPT | Mod: GP | Performed by: PHYSICAL THERAPIST

## 2017-09-29 ENCOUNTER — OFFICE VISIT (OUTPATIENT)
Dept: FAMILY MEDICINE | Facility: CLINIC | Age: 62
End: 2017-09-29
Payer: COMMERCIAL

## 2017-09-29 VITALS
BODY MASS INDEX: 28.81 KG/M2 | TEMPERATURE: 97.4 F | OXYGEN SATURATION: 97 % | HEART RATE: 72 BPM | HEIGHT: 69 IN | WEIGHT: 194.5 LBS | DIASTOLIC BLOOD PRESSURE: 78 MMHG | SYSTOLIC BLOOD PRESSURE: 116 MMHG

## 2017-09-29 DIAGNOSIS — Z01.818 PREOP GENERAL PHYSICAL EXAM: Primary | ICD-10-CM

## 2017-09-29 DIAGNOSIS — M25.551 HIP PAIN, RIGHT: ICD-10-CM

## 2017-09-29 LAB
HGB BLD-MCNC: 15.3 G/DL (ref 13.3–17.7)
MRSA DNA SPEC QL NAA+PROBE: NEGATIVE
SPECIMEN SOURCE: NORMAL

## 2017-09-29 PROCEDURE — 82565 ASSAY OF CREATININE: CPT | Performed by: NURSE PRACTITIONER

## 2017-09-29 PROCEDURE — 93000 ELECTROCARDIOGRAM COMPLETE: CPT | Performed by: NURSE PRACTITIONER

## 2017-09-29 PROCEDURE — 36415 COLL VENOUS BLD VENIPUNCTURE: CPT | Performed by: NURSE PRACTITIONER

## 2017-09-29 PROCEDURE — 87640 STAPH A DNA AMP PROBE: CPT | Performed by: NURSE PRACTITIONER

## 2017-09-29 PROCEDURE — 99214 OFFICE O/P EST MOD 30 MIN: CPT | Performed by: NURSE PRACTITIONER

## 2017-09-29 PROCEDURE — 85018 HEMOGLOBIN: CPT | Performed by: NURSE PRACTITIONER

## 2017-09-29 PROCEDURE — 84132 ASSAY OF SERUM POTASSIUM: CPT | Performed by: NURSE PRACTITIONER

## 2017-09-29 NOTE — PROGRESS NOTES
[facility]  [address] [place] [address]  [phone]  Dept: [phone]    PRE-OP EVALUATION:  Today's date: 2017    Jorgito Serrano (: 1955) presents for pre-operative evaluation assessment as requested by Marquis Duncan MD.  He requires evaluation and anesthesia risk assessment prior to undergoing surgery/procedure for treatment of ARTHROPLASTY HIP .  Proposed procedure: RIGHT TOTAL HIP ARTHROPLASTY    Date of Surgery/ Procedure: 10/4/17  Time of Surgery/ Procedure: 11:15 am  Hospital/Surgical Facility:  OR  Fax number for surgical facility: In [facility]  Primary Physician: Karen Larios  Type of Anesthesia Anticipated: General    Patient has a Health Care Directive or Living Will:  YES     1. no - Do you have a history of heart attack, stroke, stent, bypass or surgery on an artery in the head, neck, heart or legs? 3 brain surgeries d/t tumor. Last surgery 2012  2. YES - Do you ever have any pain or discomfort in your chest? Shlomo horses in chest    3. NO - Do you have a history of  Heart Failure?  4. NO - Are you troubled by shortness of breath when: walking on the level, up a slight hill or at night?  5. NO - Do you currently have a cold, bronchitis or other respiratory infection?  6. NO - Do you have a cough, shortness of breath or wheezing?  7. no - Do you sometimes get pains in the calves of your legs when you walk? Cramping of calves with walking that is chronic  8. NO - Do you or anyone in your family have previous history of blood clots?  9. NO - Do you or does anyone in your family have a serious bleeding problem such as prolonged bleeding following surgeries or cuts?  10. NO - Have you ever had problems with anemia or been told to take iron pills?  11. NO - Have you had any abnormal blood loss such as black, tarry or bloody stools, or abnormal vaginal bleeding?  12. NO - Have you ever had a blood transfusion?  13. NO - Have you or any of your relatives  ever had problems with anesthesia?  14. NO - Do you have sleep apnea, excessive snoring or daytime drowsiness?  15. NO - Do you have any prosthetic heart valves?  16. NO - Do you have prosthetic joints?  17. NO - Is there any chance that you may be pregnant?        HPI:                                                      Brief HPI related to upcoming procedure: a few weeks of right hip pain and falls. MRI revealed fracture of hip joint.       See problem list for active medical problems.  Problems all longstanding and stable, except as noted/documented.  See ROS for pertinent symptoms related to these conditions.                                                                                                  .    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     Priority: Medium     Tinea versicolor 01/26/2011     Priority: Medium     HYPERLIPIDEMIA LDL GOAL <130 10/31/2010     Priority: Medium     Other chronic pain 01/23/2008     Priority: Medium     Toxic effect venom 07/24/2007     Priority: Medium     Essential and other specified forms of tremor 04/16/2003     Priority: Medium      Past Medical History:   Diagnosis Date     Cerebellar mass      Depressive disorder, not elsewhere classified      Insomnia      Meningitis, bacterial      Migraine headache      Pure hypercholesterolemia      Past Surgical History:   Procedure Laterality Date     BRAIN SURGERY      posterior tumor     COLONOSCOPY  11/11/13     Current Outpatient Prescriptions   Medication Sig Dispense Refill     ALPRAZolam (XANAX) 0.5 MG tablet Take 1 tablet  "(0.5 mg) by mouth nightly as needed for anxiety 45 tablet 5     atorvastatin (LIPITOR) 20 MG tablet Take 1 tablet (20 mg) by mouth daily 1 Tablet Daily 90 tablet 3     levETIRAcetam (KEPPRA XR) 500 MG 24 hr tablet Take 1,000 mg by mouth daily  11     OTC products: None, except as noted above    No Known Allergies   Latex Allergy: NO    Social History   Substance Use Topics     Smoking status: Former Smoker     Packs/day: 0.50     Years: 25.00     Types: Cigarettes     Quit date: 11/25/2001     Smokeless tobacco: Never Used     Alcohol use No     History   Drug Use No       REVIEW OF SYSTEMS:                                                    Constitutional, HEENT, cardiovascular, pulmonary, gi and gu systems are negative, except as otherwise noted.      EXAM:                                                    /78 (BP Location: Right arm, Cuff Size: Adult Regular)  Pulse 72  Temp 97.4  F (36.3  C) (Oral)  Ht 5' 9\" (1.753 m)  Wt 194 lb 8 oz (88.2 kg)  SpO2 97%  BMI 28.72 kg/m2    GENERAL APPEARANCE: healthy, alert and no distress     EYES: EOMI,  PERRL     HENT: ear canals and TM's normal and nose and mouth without ulcers or lesions     NECK: no adenopathy, no asymmetry, masses, or scars and thyroid normal to palpation     RESP: lungs clear to auscultation - no rales, rhonchi or wheezes     CV: regular rates and rhythm, normal S1 S2, no S3 or S4 and no murmur, click or rub     MS: extremities normal- no gross deformities noted, no evidence of inflammation in joints, FROM in all extremities.     SKIN: no suspicious lesions or rashes     NEURO: Normal strength and tone, sensory exam grossly normal, mentation intact and speech normal     PSYCH: mentation appears normal. and affect normal/bright     LYMPHATICS: No axillary, cervical, or supraclavicular nodes    DIAGNOSTICS:                                                      EKG: NSR. No acute findings    Labs Resulted Today:   Results for orders placed or " performed in visit on 09/29/17   Potassium   Result Value Ref Range    Potassium 4.5 3.4 - 5.3 mmol/L   Creatinine   Result Value Ref Range    Creatinine 0.97 0.66 - 1.25 mg/dL    GFR Estimate 79 >60 mL/min/1.7m2    GFR Estimate If Black >90 >60 mL/min/1.7m2   Hemoglobin   Result Value Ref Range    Hemoglobin 15.3 13.3 - 17.7 g/dL   Methicillin Resistant Staph Aureus PCR   Result Value Ref Range    Specimen Description Nares     S Aur Meth Resis PCR Negative NEG^Negative         Recent Labs   Lab Test  06/19/17   1029  02/02/15   1009  12/14/12   0023  10/23/12   HGB   --   16.1  12.1*   < >   --    PLT   --   281  205   < >   --    INR   --    --    --    --   1.0   NA  142   --   140   --    --    POTASSIUM  4.6   --   4.3   < >  4.5   CR  0.98   --   0.77   < >  0.8    < > = values in this interval not displayed.        IMPRESSION:                                                    Reason for surgery/procedure: R hip replacement  Diagnosis/reason for consult: medical preop    The proposed surgical procedure is considered INTERMEDIATE risk.    REVISED CARDIAC RISK INDEX  The patient has the following serious cardiovascular risks for perioperative complications such as (MI, PE, VFib and 3  AV Block):  No serious cardiac risks  INTERPRETATION: 0 risks: Class I (very low risk - 0.4% complication rate)    The patient has the following additional risks for perioperative complications:  No identified additional risks      ICD-10-CM    1. Preop general physical exam Z01.818 Potassium     Creatinine     Hemoglobin     EKG 12-lead complete w/read - Clinics     MRSA MSSA Presurgical Screen   2. Hip pain, right M25.551        RECOMMENDATIONS:                                                        --Patient is to take all scheduled medications on the day of surgery EXCEPT for modifications listed below.        APPROVAL GIVEN to proceed with proposed procedure, without further diagnostic evaluation       Signed Electronically  by: CARLY Matias CNP    Copy of this evaluation report is provided to requesting physician.    [place] Preop Guidelines

## 2017-09-29 NOTE — MR AVS SNAPSHOT
After Visit Summary   9/29/2017    Jorgito Serrano    MRN: [id number]           Patient Information     Date Of Birth          1955        Visit Information        Provider Department      9/29/2017 11:30 AM Lazaro Preciado APRN CNP [facility]        Today's Diagnoses     Preop general physical exam    -  1    Hip pain, right          Care Instructions      Before Your Surgery      Call your surgeon if there is any change in your health. This includes signs of a cold or flu (such as a sore throat, runny nose, cough, rash or fever).    Do not smoke, drink alcohol or take over the counter medicine (unless your surgeon or primary care doctor tells you to) for the 24 hours before and after surgery.    If you take prescribed drugs: Follow your doctor s orders about which medicines to take and which to stop until after surgery.    Eating and drinking prior to surgery: follow the instructions from your surgeon    Take a shower or bath the night before surgery. Use the soap your surgeon gave you to gently clean your skin. If you do not have soap from your surgeon, use your regular soap. Do not shave or scrub the surgery site.  Wear clean pajamas and have clean sheets on your bed.           Follow-ups after your visit        Your next 10 appointments already scheduled     Oct 04, 2017   Procedure with Marquis Hernandez MD   [facility] (--)    [address]  [facility] [id number] [phone]              Who to contact     If you have questions or need follow up information about today's clinic visit or your schedule please contact [facility] directly at [phone].  Normal or non-critical lab and imaging results will be communicated to you by MyChart, letter or phone within 4 business days after the clinic has received the results. If you do not hear from us within 7 days, please contact the clinic through MyChart or phone. If you have a  "critical or abnormal lab result, we will notify you by phone as soon as possible.  Submit refill requests through [employer] or call your pharmacy and they will forward the refill request to us. Please allow 3 business days for your refill to be completed.          Additional Information About Your Visit        MyChart Information     [employer] gives you secure access to your electronic health record. If you see a primary care provider, you can also send messages to your care team and make appointments. If you have questions, please call your primary care clinic.  If you do not have a primary care provider, please call [phone] and they will assist you.        Care EveryWhere ID     This is your Care EveryWhere ID. This could be used by other organizations to access your [facility] medical records  [id number]        Your Vitals Were     Pulse Temperature Height Pulse Oximetry BMI (Body Mass Index)       72 97.4  F (36.3  C) (Oral) 5' 9\" (1.753 m) 97% 28.72 kg/m2        Blood Pressure from Last 3 Encounters:   09/29/17 116/78   09/15/17 124/80   08/23/17 107/63    Weight from Last 3 Encounters:   09/29/17 194 lb 8 oz (88.2 kg)   09/15/17 196 lb 12.8 oz (89.3 kg)   08/23/17 195 lb (88.5 kg)              We Performed the Following     Creatinine     EKG 12-lead complete w/read - Clinics     Hemoglobin     Methicillin Resistant Staph Aureus PCR     Potassium        Primary Care Provider Office Phone # Fax #    Karen Larios PA-C [phone] [address] [place] [address]        Equal Access to Services     [facility]: Hadii aad ku hadasho Soomaali, waaxda luqadaha, qaybta kaalmada alfred, carroll hassan . So [facility] [phone].    ATENCIÓN: Si habla español, tiene a jurado disposición servicios gratuitos de asistencia lingüística. Llame al [phone].    We comply with applicable federal civil rights laws and [place] laws. We do not discriminate on the basis of " race, color, national origin, age, disability, sex, sexual orientation, or gender identity.            Thank you!     Thank you for choosing [facility]  for your care. Our goal is always to provide you with excellent care. Hearing back from our patients is one way we can continue to improve our services. Please take a few minutes to complete the written survey that you may receive in the mail after your visit with us. Thank you!             Your Updated Medication List - Protect others around you: Learn how to safely use, store and throw away your medicines at www.disposemymeds.org.          This list is accurate as of: 9/29/17 11:59 PM.  Always use your most recent med list.                   Brand Name Dispense Instructions for use Diagnosis    atorvastatin 20 MG tablet    LIPITOR    90 tablet    Take 1 tablet (20 mg) by mouth daily 1 Tablet Daily    Hyperlipidemia LDL goal <130       KEPPRA XR PO      Take 1,000 mg by mouth At Bedtime (2 x 500 mg tablet = 1000 mg dose)        XANAX PO      Take 0.75 mg by mouth At Bedtime (1.5 tablet x 0.5 mg = 0.75 mg dose)

## 2017-09-29 NOTE — NURSING NOTE
"Chief Complaint   Patient presents with     Pre-Op Exam       Initial /78 (BP Location: Right arm, Cuff Size: Adult Regular)  Pulse 72  Temp 97.4  F (36.3  C) (Oral)  Ht 5' 9\" (1.753 m)  Wt 194 lb 8 oz (88.2 kg)  SpO2 97%  BMI 28.72 kg/m2 Estimated body mass index is 28.72 kg/(m^2) as calculated from the following:    Height as of this encounter: 5' 9\" (1.753 m).    Weight as of this encounter: 194 lb 8 oz (88.2 kg).  Medication Reconciliation: complete     CHAPINCITO Nails      "

## 2017-09-30 LAB
CREAT SERPL-MCNC: 0.97 MG/DL (ref 0.66–1.25)
GFR SERPL CREATININE-BSD FRML MDRD: 79 ML/MIN/1.7M2
POTASSIUM SERPL-SCNC: 4.5 MMOL/L (ref 3.4–5.3)

## 2017-10-03 NOTE — PROGRESS NOTES
Hi Jorgito  All of your lab results look great! Let me know if you have questions. Best of luck on your surgery  CARLY Roman CNP

## 2017-10-03 NOTE — H&P (VIEW-ONLY)
[facility]  [address] [place] [id number]  [phone]  Dept: [phone]    PRE-OP EVALUATION:  Today's date: 2017    Jorgito Serrano (: 1955) presents for pre-operative evaluation assessment as requested by Marquis Duncan MD.  He requires evaluation and anesthesia risk assessment prior to undergoing surgery/procedure for treatment of ARTHROPLASTY HIP .  Proposed procedure: RIGHT TOTAL HIP ARTHROPLASTY    Date of Surgery/ Procedure: 10/4/17  Time of Surgery/ Procedure: 11:15 am  Hospital/Surgical Facility:  OR  Fax number for surgical facility: In [facility]  Primary Physician: Karen Lraios  Type of Anesthesia Anticipated: General    Patient has a Health Care Directive or Living Will:  YES     1. no - Do you have a history of heart attack, stroke, stent, bypass or surgery on an artery in the head, neck, heart or legs? 3 brain surgeries d/t tumor. Last surgery 2012  2. YES - Do you ever have any pain or discomfort in your chest? Shlomo horses in chest    3. NO - Do you have a history of  Heart Failure?  4. NO - Are you troubled by shortness of breath when: walking on the level, up a slight hill or at night?  5. NO - Do you currently have a cold, bronchitis or other respiratory infection?  6. NO - Do you have a cough, shortness of breath or wheezing?  7. no - Do you sometimes get pains in the calves of your legs when you walk? Cramping of calves with walking that is chronic  8. NO - Do you or anyone in your family have previous history of blood clots?  9. NO - Do you or does anyone in your family have a serious bleeding problem such as prolonged bleeding following surgeries or cuts?  10. NO - Have you ever had problems with anemia or been told to take iron pills?  11. NO - Have you had any abnormal blood loss such as black, tarry or bloody stools, or abnormal vaginal bleeding?  12. NO - Have you ever had a blood transfusion?  13. NO - Have you or any of your relatives  ever had problems with anesthesia?  14. NO - Do you have sleep apnea, excessive snoring or daytime drowsiness?  15. NO - Do you have any prosthetic heart valves?  16. NO - Do you have prosthetic joints?  17. NO - Is there any chance that you may be pregnant?        HPI:                                                      Brief HPI related to upcoming procedure: a few weeks of right hip pain and falls. MRI revealed fracture of hip joint.       See problem list for active medical problems.  Problems all longstanding and stable, except as noted/documented.  See ROS for pertinent symptoms related to these conditions.                                                                                                  .    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     Priority: Medium     Tinea versicolor 01/26/2011     Priority: Medium     HYPERLIPIDEMIA LDL GOAL <130 10/31/2010     Priority: Medium     Other chronic pain 01/23/2008     Priority: Medium     Toxic effect venom 07/24/2007     Priority: Medium     Essential and other specified forms of tremor 04/16/2003     Priority: Medium      Past Medical History:   Diagnosis Date     Cerebellar mass      Depressive disorder, not elsewhere classified      Insomnia      Meningitis, bacterial      Migraine headache      Pure hypercholesterolemia      Past Surgical History:   Procedure Laterality Date     BRAIN SURGERY      posterior tumor     COLONOSCOPY  11/11/13     Current Outpatient Prescriptions   Medication Sig Dispense Refill     ALPRAZolam (XANAX) 0.5 MG tablet Take 1 tablet  "(0.5 mg) by mouth nightly as needed for anxiety 45 tablet 5     atorvastatin (LIPITOR) 20 MG tablet Take 1 tablet (20 mg) by mouth daily 1 Tablet Daily 90 tablet 3     levETIRAcetam (KEPPRA XR) 500 MG 24 hr tablet Take 1,000 mg by mouth daily  11     OTC products: None, except as noted above    No Known Allergies   Latex Allergy: NO    Social History   Substance Use Topics     Smoking status: Former Smoker     Packs/day: 0.50     Years: 25.00     Types: Cigarettes     Quit date: 11/25/2001     Smokeless tobacco: Never Used     Alcohol use No     History   Drug Use No       REVIEW OF SYSTEMS:                                                    Constitutional, HEENT, cardiovascular, pulmonary, gi and gu systems are negative, except as otherwise noted.      EXAM:                                                    /78 (BP Location: Right arm, Cuff Size: Adult Regular)  Pulse 72  Temp 97.4  F (36.3  C) (Oral)  Ht 5' 9\" (1.753 m)  Wt 194 lb 8 oz (88.2 kg)  SpO2 97%  BMI 28.72 kg/m2    GENERAL APPEARANCE: healthy, alert and no distress     EYES: EOMI,  PERRL     HENT: ear canals and TM's normal and nose and mouth without ulcers or lesions     NECK: no adenopathy, no asymmetry, masses, or scars and thyroid normal to palpation     RESP: lungs clear to auscultation - no rales, rhonchi or wheezes     CV: regular rates and rhythm, normal S1 S2, no S3 or S4 and no murmur, click or rub     MS: extremities normal- no gross deformities noted, no evidence of inflammation in joints, FROM in all extremities.     SKIN: no suspicious lesions or rashes     NEURO: Normal strength and tone, sensory exam grossly normal, mentation intact and speech normal     PSYCH: mentation appears normal. and affect normal/bright     LYMPHATICS: No axillary, cervical, or supraclavicular nodes    DIAGNOSTICS:                                                      EKG: NSR. No acute findings    Labs Resulted Today:   Results for orders placed or " performed in visit on 09/29/17   Potassium   Result Value Ref Range    Potassium 4.5 3.4 - 5.3 mmol/L   Creatinine   Result Value Ref Range    Creatinine 0.97 0.66 - 1.25 mg/dL    GFR Estimate 79 >60 mL/min/1.7m2    GFR Estimate If Black >90 >60 mL/min/1.7m2   Hemoglobin   Result Value Ref Range    Hemoglobin 15.3 13.3 - 17.7 g/dL   Methicillin Resistant Staph Aureus PCR   Result Value Ref Range    Specimen Description Nares     S Aur Meth Resis PCR Negative NEG^Negative         Recent Labs   Lab Test  06/19/17   1029  02/02/15   1009  12/14/12   0023  10/23/12   HGB   --   16.1  12.1*   < >   --    PLT   --   281  205   < >   --    INR   --    --    --    --   1.0   NA  142   --   140   --    --    POTASSIUM  4.6   --   4.3   < >  4.5   CR  0.98   --   0.77   < >  0.8    < > = values in this interval not displayed.        IMPRESSION:                                                    Reason for surgery/procedure: R hip replacement  Diagnosis/reason for consult: medical preop    The proposed surgical procedure is considered INTERMEDIATE risk.    REVISED CARDIAC RISK INDEX  The patient has the following serious cardiovascular risks for perioperative complications such as (MI, PE, VFib and 3  AV Block):  No serious cardiac risks  INTERPRETATION: 0 risks: Class I (very low risk - 0.4% complication rate)    The patient has the following additional risks for perioperative complications:  No identified additional risks      ICD-10-CM    1. Preop general physical exam Z01.818 Potassium     Creatinine     Hemoglobin     EKG 12-lead complete w/read - Clinics     MRSA MSSA Presurgical Screen   2. Hip pain, right M25.551        RECOMMENDATIONS:                                                        --Patient is to take all scheduled medications on the day of surgery EXCEPT for modifications listed below.        APPROVAL GIVEN to proceed with proposed procedure, without further diagnostic evaluation       Signed Electronically  by: CARLY Matias CNP    Copy of this evaluation report is provided to requesting physician.    [facility] Preop Guidelines

## 2017-10-04 ENCOUNTER — ANESTHESIA EVENT (OUTPATIENT)
Dept: SURGERY | Facility: CLINIC | Age: 62
DRG: 470 | End: 2017-10-04
Payer: COMMERCIAL

## 2017-10-04 ENCOUNTER — ANESTHESIA (OUTPATIENT)
Dept: SURGERY | Facility: CLINIC | Age: 62
DRG: 470 | End: 2017-10-04
Payer: COMMERCIAL

## 2017-10-04 ENCOUNTER — HOSPITAL ENCOUNTER (INPATIENT)
Facility: CLINIC | Age: 62
LOS: 3 days | Discharge: HOME | DRG: 470 | End: 2017-10-07
Attending: ORTHOPAEDIC SURGERY | Admitting: ORTHOPAEDIC SURGERY
Payer: COMMERCIAL

## 2017-10-04 ENCOUNTER — APPOINTMENT (OUTPATIENT)
Dept: GENERAL RADIOLOGY | Facility: CLINIC | Age: 62
DRG: 470 | End: 2017-10-04
Attending: ORTHOPAEDIC SURGERY
Payer: COMMERCIAL

## 2017-10-04 DIAGNOSIS — M25.511 ACUTE PAIN OF RIGHT SHOULDER: ICD-10-CM

## 2017-10-04 DIAGNOSIS — Z96.641 H/O TOTAL HIP ARTHROPLASTY, RIGHT: Primary | ICD-10-CM

## 2017-10-04 LAB
ABO + RH BLD: NORMAL
ABO + RH BLD: NORMAL
BLD GP AB SCN SERPL QL: NORMAL
BLOOD BANK CMNT PATIENT-IMP: NORMAL
CREAT SERPL-MCNC: 0.87 MG/DL (ref 0.66–1.25)
GFR SERPL CREATININE-BSD FRML MDRD: 90 ML/MIN/1.7M2
PLATELET # BLD AUTO: 244 10E9/L (ref 150–450)
SPECIMEN EXP DATE BLD: NORMAL

## 2017-10-04 PROCEDURE — 88304 TISSUE EXAM BY PATHOLOGIST: CPT | Performed by: ORTHOPAEDIC SURGERY

## 2017-10-04 PROCEDURE — 36415 COLL VENOUS BLD VENIPUNCTURE: CPT | Performed by: ORTHOPAEDIC SURGERY

## 2017-10-04 PROCEDURE — 25000125 ZZHC RX 250: Performed by: ANESTHESIOLOGY

## 2017-10-04 PROCEDURE — 25000125 ZZHC RX 250: Performed by: NURSE ANESTHETIST, CERTIFIED REGISTERED

## 2017-10-04 PROCEDURE — 40000170 ZZH STATISTIC PRE-PROCEDURE ASSESSMENT II: Performed by: ORTHOPAEDIC SURGERY

## 2017-10-04 PROCEDURE — 88304 TISSUE EXAM BY PATHOLOGIST: CPT | Mod: 26 | Performed by: ORTHOPAEDIC SURGERY

## 2017-10-04 PROCEDURE — 88311 DECALCIFY TISSUE: CPT | Performed by: ORTHOPAEDIC SURGERY

## 2017-10-04 PROCEDURE — 25800025 ZZH RX 258: Performed by: ORTHOPAEDIC SURGERY

## 2017-10-04 PROCEDURE — 86900 BLOOD TYPING SEROLOGIC ABO: CPT | Performed by: ORTHOPAEDIC SURGERY

## 2017-10-04 PROCEDURE — 27210995 ZZH RX 272: Performed by: ORTHOPAEDIC SURGERY

## 2017-10-04 PROCEDURE — 25000125 ZZHC RX 250: Performed by: ORTHOPAEDIC SURGERY

## 2017-10-04 PROCEDURE — C1776 JOINT DEVICE (IMPLANTABLE): HCPCS | Performed by: ORTHOPAEDIC SURGERY

## 2017-10-04 PROCEDURE — 25000132 ZZH RX MED GY IP 250 OP 250 PS 637: Performed by: ORTHOPAEDIC SURGERY

## 2017-10-04 PROCEDURE — 88311 DECALCIFY TISSUE: CPT | Mod: 26 | Performed by: ORTHOPAEDIC SURGERY

## 2017-10-04 PROCEDURE — 25000128 H RX IP 250 OP 636: Performed by: ANESTHESIOLOGY

## 2017-10-04 PROCEDURE — 37000008 ZZH ANESTHESIA TECHNICAL FEE, 1ST 30 MIN: Performed by: ORTHOPAEDIC SURGERY

## 2017-10-04 PROCEDURE — 25000566 ZZH SEVOFLURANE, EA 15 MIN: Performed by: ORTHOPAEDIC SURGERY

## 2017-10-04 PROCEDURE — 85049 AUTOMATED PLATELET COUNT: CPT | Performed by: ORTHOPAEDIC SURGERY

## 2017-10-04 PROCEDURE — 36000093 ZZH SURGERY LEVEL 4 1ST 30 MIN: Performed by: ORTHOPAEDIC SURGERY

## 2017-10-04 PROCEDURE — 86901 BLOOD TYPING SEROLOGIC RH(D): CPT | Performed by: ORTHOPAEDIC SURGERY

## 2017-10-04 PROCEDURE — 36000063 ZZH SURGERY LEVEL 4 EA 15 ADDTL MIN: Performed by: ORTHOPAEDIC SURGERY

## 2017-10-04 PROCEDURE — 86850 RBC ANTIBODY SCREEN: CPT | Performed by: ORTHOPAEDIC SURGERY

## 2017-10-04 PROCEDURE — S0020 INJECTION, BUPIVICAINE HYDRO: HCPCS | Performed by: ORTHOPAEDIC SURGERY

## 2017-10-04 PROCEDURE — C1713 ANCHOR/SCREW BN/BN,TIS/BN: HCPCS | Performed by: ORTHOPAEDIC SURGERY

## 2017-10-04 PROCEDURE — 82565 ASSAY OF CREATININE: CPT | Performed by: ORTHOPAEDIC SURGERY

## 2017-10-04 PROCEDURE — 25000128 H RX IP 250 OP 636: Performed by: ORTHOPAEDIC SURGERY

## 2017-10-04 PROCEDURE — 12000007 ZZH R&B INTERMEDIATE

## 2017-10-04 PROCEDURE — 37000009 ZZH ANESTHESIA TECHNICAL FEE, EACH ADDTL 15 MIN: Performed by: ORTHOPAEDIC SURGERY

## 2017-10-04 PROCEDURE — 27210794 ZZH OR GENERAL SUPPLY STERILE: Performed by: ORTHOPAEDIC SURGERY

## 2017-10-04 PROCEDURE — 71000012 ZZH RECOVERY PHASE 1 LEVEL 1 FIRST HR: Performed by: ORTHOPAEDIC SURGERY

## 2017-10-04 PROCEDURE — 40000986 XR PELVIS AD HIP PORTABLE RIGHT 1 VIEW

## 2017-10-04 PROCEDURE — 71000013 ZZH RECOVERY PHASE 1 LEVEL 1 EA ADDTL HR: Performed by: ORTHOPAEDIC SURGERY

## 2017-10-04 PROCEDURE — 0SR901A REPLACEMENT OF RIGHT HIP JOINT WITH METAL SYNTHETIC SUBSTITUTE, UNCEMENTED, OPEN APPROACH: ICD-10-PCS | Performed by: ORTHOPAEDIC SURGERY

## 2017-10-04 PROCEDURE — 25000128 H RX IP 250 OP 636: Performed by: NURSE ANESTHETIST, CERTIFIED REGISTERED

## 2017-10-04 DEVICE — IMP CUP ACE PINNACLE 56MM 1217-22-056: Type: IMPLANTABLE DEVICE | Site: HIP | Status: FUNCTIONAL

## 2017-10-04 DEVICE — IMP LINER HIP DEPUY PINNACLE ALTRX 36X56MM +4 1221-36-456: Type: IMPLANTABLE DEVICE | Site: HIP | Status: FUNCTIONAL

## 2017-10-04 DEVICE — IMP STEM FEMORAL HIP DEPUY SUMMIT 7 1570-11-135: Type: IMPLANTABLE DEVICE | Site: HIP | Status: FUNCTIONAL

## 2017-10-04 DEVICE — IMP HEAD FEMORAL DEPUY 36MM -2 1365-50-000: Type: IMPLANTABLE DEVICE | Site: HIP | Status: FUNCTIONAL

## 2017-10-04 DEVICE — IMP SCR BONE CAN ACE 6.5X30MM 1217-30-500: Type: IMPLANTABLE DEVICE | Site: HIP | Status: FUNCTIONAL

## 2017-10-04 RX ORDER — KETOROLAC TROMETHAMINE 30 MG/ML
30 INJECTION, SOLUTION INTRAMUSCULAR; INTRAVENOUS EVERY 6 HOURS PRN
Status: DISCONTINUED | OUTPATIENT
Start: 2017-10-04 | End: 2017-10-07 | Stop reason: HOSPADM

## 2017-10-04 RX ORDER — ACETAMINOPHEN 325 MG/1
975 TABLET ORAL EVERY 8 HOURS
Status: COMPLETED | OUTPATIENT
Start: 2017-10-04 | End: 2017-10-07

## 2017-10-04 RX ORDER — SODIUM CHLORIDE 9 MG/ML
INJECTION, SOLUTION INTRAVENOUS CONTINUOUS
Status: DISCONTINUED | OUTPATIENT
Start: 2017-10-04 | End: 2017-10-07 | Stop reason: HOSPADM

## 2017-10-04 RX ORDER — BUPIVACAINE HYDROCHLORIDE AND EPINEPHRINE 2.5; 5 MG/ML; UG/ML
INJECTION, SOLUTION INFILTRATION; PERINEURAL PRN
Status: DISCONTINUED | OUTPATIENT
Start: 2017-10-04 | End: 2017-10-04 | Stop reason: HOSPADM

## 2017-10-04 RX ORDER — ONDANSETRON 2 MG/ML
4 INJECTION INTRAMUSCULAR; INTRAVENOUS EVERY 30 MIN PRN
Status: DISCONTINUED | OUTPATIENT
Start: 2017-10-04 | End: 2017-10-04 | Stop reason: HOSPADM

## 2017-10-04 RX ORDER — CEFAZOLIN SODIUM 2 G/100ML
2 INJECTION, SOLUTION INTRAVENOUS EVERY 8 HOURS
Status: COMPLETED | OUTPATIENT
Start: 2017-10-04 | End: 2017-10-05

## 2017-10-04 RX ORDER — DEXAMETHASONE SODIUM PHOSPHATE 4 MG/ML
INJECTION, SOLUTION INTRA-ARTICULAR; INTRALESIONAL; INTRAMUSCULAR; INTRAVENOUS; SOFT TISSUE PRN
Status: DISCONTINUED | OUTPATIENT
Start: 2017-10-04 | End: 2017-10-04

## 2017-10-04 RX ORDER — ONDANSETRON 4 MG/1
4 TABLET, ORALLY DISINTEGRATING ORAL EVERY 30 MIN PRN
Status: DISCONTINUED | OUTPATIENT
Start: 2017-10-04 | End: 2017-10-04 | Stop reason: HOSPADM

## 2017-10-04 RX ORDER — PROPOFOL 10 MG/ML
INJECTION, EMULSION INTRAVENOUS CONTINUOUS PRN
Status: DISCONTINUED | OUTPATIENT
Start: 2017-10-04 | End: 2017-10-04

## 2017-10-04 RX ORDER — SODIUM CHLORIDE, SODIUM LACTATE, POTASSIUM CHLORIDE, CALCIUM CHLORIDE 600; 310; 30; 20 MG/100ML; MG/100ML; MG/100ML; MG/100ML
INJECTION, SOLUTION INTRAVENOUS CONTINUOUS
Status: DISCONTINUED | OUTPATIENT
Start: 2017-10-04 | End: 2017-10-04 | Stop reason: HOSPADM

## 2017-10-04 RX ORDER — OXYCODONE HCL 10 MG/1
10 TABLET, FILM COATED, EXTENDED RELEASE ORAL EVERY 12 HOURS
Status: COMPLETED | OUTPATIENT
Start: 2017-10-04 | End: 2017-10-06

## 2017-10-04 RX ORDER — GLYCOPYRROLATE 0.2 MG/ML
INJECTION, SOLUTION INTRAMUSCULAR; INTRAVENOUS PRN
Status: DISCONTINUED | OUTPATIENT
Start: 2017-10-04 | End: 2017-10-04

## 2017-10-04 RX ORDER — BUPIVACAINE HYDROCHLORIDE 2.5 MG/ML
INJECTION, SOLUTION EPIDURAL; INFILTRATION; INTRACAUDAL PRN
Status: DISCONTINUED | OUTPATIENT
Start: 2017-10-04 | End: 2017-10-04 | Stop reason: HOSPADM

## 2017-10-04 RX ORDER — ALBUTEROL SULFATE 0.83 MG/ML
2.5 SOLUTION RESPIRATORY (INHALATION) EVERY 4 HOURS PRN
Status: DISCONTINUED | OUTPATIENT
Start: 2017-10-04 | End: 2017-10-04 | Stop reason: HOSPADM

## 2017-10-04 RX ORDER — OXYCODONE HYDROCHLORIDE 5 MG/1
5-10 TABLET ORAL
Status: DISCONTINUED | OUTPATIENT
Start: 2017-10-04 | End: 2017-10-07 | Stop reason: HOSPADM

## 2017-10-04 RX ORDER — CEFAZOLIN SODIUM 1 G/3ML
1 INJECTION, POWDER, FOR SOLUTION INTRAMUSCULAR; INTRAVENOUS SEE ADMIN INSTRUCTIONS
Status: CANCELLED | OUTPATIENT
Start: 2017-10-04

## 2017-10-04 RX ORDER — LIDOCAINE HYDROCHLORIDE 20 MG/ML
INJECTION, SOLUTION INFILTRATION; PERINEURAL PRN
Status: DISCONTINUED | OUTPATIENT
Start: 2017-10-04 | End: 2017-10-04

## 2017-10-04 RX ORDER — ONDANSETRON 2 MG/ML
4 INJECTION INTRAMUSCULAR; INTRAVENOUS EVERY 6 HOURS PRN
Status: DISCONTINUED | OUTPATIENT
Start: 2017-10-04 | End: 2017-10-07 | Stop reason: HOSPADM

## 2017-10-04 RX ORDER — PROPOFOL 10 MG/ML
INJECTION, EMULSION INTRAVENOUS PRN
Status: DISCONTINUED | OUTPATIENT
Start: 2017-10-04 | End: 2017-10-04

## 2017-10-04 RX ORDER — FENTANYL CITRATE 50 UG/ML
INJECTION, SOLUTION INTRAMUSCULAR; INTRAVENOUS PRN
Status: DISCONTINUED | OUTPATIENT
Start: 2017-10-04 | End: 2017-10-04

## 2017-10-04 RX ORDER — CEFAZOLIN SODIUM 2 G/100ML
2 INJECTION, SOLUTION INTRAVENOUS
Status: DISCONTINUED | OUTPATIENT
Start: 2017-10-04 | End: 2017-10-04 | Stop reason: HOSPADM

## 2017-10-04 RX ORDER — PROCHLORPERAZINE MALEATE 5 MG
5-10 TABLET ORAL EVERY 6 HOURS PRN
Status: DISCONTINUED | OUTPATIENT
Start: 2017-10-04 | End: 2017-10-07 | Stop reason: HOSPADM

## 2017-10-04 RX ORDER — MAGNESIUM HYDROXIDE 1200 MG/15ML
LIQUID ORAL PRN
Status: DISCONTINUED | OUTPATIENT
Start: 2017-10-04 | End: 2017-10-04 | Stop reason: HOSPADM

## 2017-10-04 RX ORDER — FENTANYL CITRATE 50 UG/ML
25-50 INJECTION, SOLUTION INTRAMUSCULAR; INTRAVENOUS
Status: DISCONTINUED | OUTPATIENT
Start: 2017-10-04 | End: 2017-10-04 | Stop reason: HOSPADM

## 2017-10-04 RX ORDER — ONDANSETRON 2 MG/ML
INJECTION INTRAMUSCULAR; INTRAVENOUS PRN
Status: DISCONTINUED | OUTPATIENT
Start: 2017-10-04 | End: 2017-10-04

## 2017-10-04 RX ORDER — CEFAZOLIN SODIUM 1 G/3ML
1 INJECTION, POWDER, FOR SOLUTION INTRAMUSCULAR; INTRAVENOUS SEE ADMIN INSTRUCTIONS
Status: DISCONTINUED | OUTPATIENT
Start: 2017-10-04 | End: 2017-10-04 | Stop reason: HOSPADM

## 2017-10-04 RX ORDER — HYDROMORPHONE HYDROCHLORIDE 1 MG/ML
.3-.5 INJECTION, SOLUTION INTRAMUSCULAR; INTRAVENOUS; SUBCUTANEOUS EVERY 5 MIN PRN
Status: DISCONTINUED | OUTPATIENT
Start: 2017-10-04 | End: 2017-10-04 | Stop reason: HOSPADM

## 2017-10-04 RX ORDER — OXYCODONE HCL 10 MG/1
20 TABLET, FILM COATED, EXTENDED RELEASE ORAL ONCE
Status: COMPLETED | OUTPATIENT
Start: 2017-10-04 | End: 2017-10-04

## 2017-10-04 RX ORDER — LIDOCAINE 40 MG/G
CREAM TOPICAL
Status: DISCONTINUED | OUTPATIENT
Start: 2017-10-04 | End: 2017-10-07 | Stop reason: HOSPADM

## 2017-10-04 RX ORDER — HYDROXYZINE HYDROCHLORIDE 25 MG/1
25 TABLET, FILM COATED ORAL EVERY 6 HOURS PRN
Status: DISCONTINUED | OUTPATIENT
Start: 2017-10-04 | End: 2017-10-07 | Stop reason: HOSPADM

## 2017-10-04 RX ORDER — NALOXONE HYDROCHLORIDE 0.4 MG/ML
.1-.4 INJECTION, SOLUTION INTRAMUSCULAR; INTRAVENOUS; SUBCUTANEOUS
Status: DISCONTINUED | OUTPATIENT
Start: 2017-10-04 | End: 2017-10-07 | Stop reason: HOSPADM

## 2017-10-04 RX ORDER — MEPERIDINE HYDROCHLORIDE 25 MG/ML
12.5 INJECTION INTRAMUSCULAR; INTRAVENOUS; SUBCUTANEOUS EVERY 5 MIN PRN
Status: DISCONTINUED | OUTPATIENT
Start: 2017-10-04 | End: 2017-10-04 | Stop reason: HOSPADM

## 2017-10-04 RX ORDER — ONDANSETRON 4 MG/1
4 TABLET, ORALLY DISINTEGRATING ORAL EVERY 6 HOURS PRN
Status: DISCONTINUED | OUTPATIENT
Start: 2017-10-04 | End: 2017-10-07 | Stop reason: HOSPADM

## 2017-10-04 RX ORDER — CEFAZOLIN SODIUM 2 G/100ML
2 INJECTION, SOLUTION INTRAVENOUS
Status: CANCELLED | OUTPATIENT
Start: 2017-10-04

## 2017-10-04 RX ORDER — ACETAMINOPHEN 325 MG/1
650 TABLET ORAL EVERY 4 HOURS PRN
Status: DISCONTINUED | OUTPATIENT
Start: 2017-10-07 | End: 2017-10-07 | Stop reason: HOSPADM

## 2017-10-04 RX ORDER — LEVETIRACETAM 500 MG/1
1000 TABLET, FILM COATED, EXTENDED RELEASE ORAL AT BEDTIME
Status: DISCONTINUED | OUTPATIENT
Start: 2017-10-04 | End: 2017-10-07 | Stop reason: HOSPADM

## 2017-10-04 RX ORDER — ATORVASTATIN CALCIUM 20 MG/1
20 TABLET, FILM COATED ORAL DAILY
Status: DISCONTINUED | OUTPATIENT
Start: 2017-10-04 | End: 2017-10-07 | Stop reason: HOSPADM

## 2017-10-04 RX ORDER — EPHEDRINE SULFATE 50 MG/ML
INJECTION, SOLUTION INTRAMUSCULAR; INTRAVENOUS; SUBCUTANEOUS PRN
Status: DISCONTINUED | OUTPATIENT
Start: 2017-10-04 | End: 2017-10-04

## 2017-10-04 RX ORDER — HYDROMORPHONE HYDROCHLORIDE 1 MG/ML
.3-.5 INJECTION, SOLUTION INTRAMUSCULAR; INTRAVENOUS; SUBCUTANEOUS
Status: DISCONTINUED | OUTPATIENT
Start: 2017-10-04 | End: 2017-10-07 | Stop reason: HOSPADM

## 2017-10-04 RX ORDER — NEOSTIGMINE METHYLSULFATE 1 MG/ML
VIAL (ML) INJECTION PRN
Status: DISCONTINUED | OUTPATIENT
Start: 2017-10-04 | End: 2017-10-04

## 2017-10-04 RX ADMIN — PHENYLEPHRINE HYDROCHLORIDE 50 MCG: 10 INJECTION, SOLUTION INTRAMUSCULAR; INTRAVENOUS; SUBCUTANEOUS at 13:34

## 2017-10-04 RX ADMIN — ONDANSETRON 4 MG: 2 INJECTION INTRAMUSCULAR; INTRAVENOUS at 13:24

## 2017-10-04 RX ADMIN — LIDOCAINE HYDROCHLORIDE 1 ML: 10 INJECTION, SOLUTION EPIDURAL; INFILTRATION; INTRACAUDAL; PERINEURAL at 10:29

## 2017-10-04 RX ADMIN — LIDOCAINE HYDROCHLORIDE 60 MG: 20 INJECTION, SOLUTION INFILTRATION; PERINEURAL at 11:47

## 2017-10-04 RX ADMIN — DEXAMETHASONE SODIUM PHOSPHATE 4 MG: 4 INJECTION, SOLUTION INTRA-ARTICULAR; INTRALESIONAL; INTRAMUSCULAR; INTRAVENOUS; SOFT TISSUE at 12:07

## 2017-10-04 RX ADMIN — LEVETIRACETAM 1000 MG: 500 TABLET, FILM COATED, EXTENDED RELEASE ORAL at 22:07

## 2017-10-04 RX ADMIN — PROPOFOL 50 MCG/KG/MIN: 10 INJECTION, EMULSION INTRAVENOUS at 12:12

## 2017-10-04 RX ADMIN — SODIUM CHLORIDE, POTASSIUM CHLORIDE, SODIUM LACTATE AND CALCIUM CHLORIDE: 600; 310; 30; 20 INJECTION, SOLUTION INTRAVENOUS at 12:25

## 2017-10-04 RX ADMIN — HYDROMORPHONE HYDROCHLORIDE 0.5 MG: 1 INJECTION, SOLUTION INTRAMUSCULAR; INTRAVENOUS; SUBCUTANEOUS at 14:18

## 2017-10-04 RX ADMIN — FENTANYL CITRATE 50 MCG: 50 INJECTION, SOLUTION INTRAMUSCULAR; INTRAVENOUS at 12:12

## 2017-10-04 RX ADMIN — CEFAZOLIN SODIUM 2 G: 2 INJECTION, SOLUTION INTRAVENOUS at 19:30

## 2017-10-04 RX ADMIN — PHENYLEPHRINE HYDROCHLORIDE 100 MCG: 10 INJECTION, SOLUTION INTRAMUSCULAR; INTRAVENOUS; SUBCUTANEOUS at 11:55

## 2017-10-04 RX ADMIN — OXYCODONE HYDROCHLORIDE 10 MG: 10 TABLET, FILM COATED, EXTENDED RELEASE ORAL at 10:34

## 2017-10-04 RX ADMIN — SODIUM CHLORIDE: 9 INJECTION, SOLUTION INTRAVENOUS at 16:44

## 2017-10-04 RX ADMIN — GLYCOPYRROLATE 0.4 MG: 0.2 INJECTION, SOLUTION INTRAMUSCULAR; INTRAVENOUS at 13:45

## 2017-10-04 RX ADMIN — PHENYLEPHRINE HYDROCHLORIDE 100 MCG: 10 INJECTION, SOLUTION INTRAMUSCULAR; INTRAVENOUS; SUBCUTANEOUS at 12:21

## 2017-10-04 RX ADMIN — Medication 5 MG: at 13:10

## 2017-10-04 RX ADMIN — PHENYLEPHRINE HYDROCHLORIDE 100 MCG: 10 INJECTION, SOLUTION INTRAMUSCULAR; INTRAVENOUS; SUBCUTANEOUS at 12:09

## 2017-10-04 RX ADMIN — ROCURONIUM BROMIDE 50 MG: 10 INJECTION INTRAVENOUS at 11:47

## 2017-10-04 RX ADMIN — Medication 5 MG: at 12:34

## 2017-10-04 RX ADMIN — PHENYLEPHRINE HYDROCHLORIDE 100 MCG: 10 INJECTION, SOLUTION INTRAMUSCULAR; INTRAVENOUS; SUBCUTANEOUS at 12:00

## 2017-10-04 RX ADMIN — ALPRAZOLAM 0.75 MG: 0.5 TABLET ORAL at 22:08

## 2017-10-04 RX ADMIN — PHENYLEPHRINE HYDROCHLORIDE 50 MCG: 10 INJECTION, SOLUTION INTRAMUSCULAR; INTRAVENOUS; SUBCUTANEOUS at 13:03

## 2017-10-04 RX ADMIN — SODIUM CHLORIDE, POTASSIUM CHLORIDE, SODIUM LACTATE AND CALCIUM CHLORIDE: 600; 310; 30; 20 INJECTION, SOLUTION INTRAVENOUS at 13:44

## 2017-10-04 RX ADMIN — PROPOFOL 160 MG: 10 INJECTION, EMULSION INTRAVENOUS at 11:47

## 2017-10-04 RX ADMIN — PHENYLEPHRINE HYDROCHLORIDE 0.25 MCG/KG/MIN: 10 INJECTION, SOLUTION INTRAMUSCULAR; INTRAVENOUS; SUBCUTANEOUS at 12:39

## 2017-10-04 RX ADMIN — OXYCODONE HYDROCHLORIDE 10 MG: 10 TABLET, FILM COATED, EXTENDED RELEASE ORAL at 18:10

## 2017-10-04 RX ADMIN — PHENYLEPHRINE HYDROCHLORIDE 50 MCG: 10 INJECTION, SOLUTION INTRAMUSCULAR; INTRAVENOUS; SUBCUTANEOUS at 13:15

## 2017-10-04 RX ADMIN — HYDROMORPHONE HYDROCHLORIDE 0.5 MG: 1 INJECTION, SOLUTION INTRAMUSCULAR; INTRAVENOUS; SUBCUTANEOUS at 13:56

## 2017-10-04 RX ADMIN — PHENYLEPHRINE HYDROCHLORIDE 100 MCG: 10 INJECTION, SOLUTION INTRAMUSCULAR; INTRAVENOUS; SUBCUTANEOUS at 11:52

## 2017-10-04 RX ADMIN — ACETAMINOPHEN 975 MG: 325 TABLET, FILM COATED ORAL at 18:09

## 2017-10-04 RX ADMIN — NEOSTIGMINE METHYLSULFATE 3 MG: 1 INJECTION INTRAMUSCULAR; INTRAVENOUS; SUBCUTANEOUS at 13:45

## 2017-10-04 RX ADMIN — OXYCODONE HYDROCHLORIDE 10 MG: 5 TABLET ORAL at 20:17

## 2017-10-04 RX ADMIN — PHENYLEPHRINE HYDROCHLORIDE 100 MCG: 10 INJECTION, SOLUTION INTRAMUSCULAR; INTRAVENOUS; SUBCUTANEOUS at 12:36

## 2017-10-04 RX ADMIN — Medication 5 MG: at 12:21

## 2017-10-04 RX ADMIN — MIDAZOLAM HYDROCHLORIDE 2 MG: 1 INJECTION, SOLUTION INTRAMUSCULAR; INTRAVENOUS at 11:41

## 2017-10-04 RX ADMIN — HYDROMORPHONE HYDROCHLORIDE 0.5 MG: 1 INJECTION, SOLUTION INTRAMUSCULAR; INTRAVENOUS; SUBCUTANEOUS at 16:43

## 2017-10-04 RX ADMIN — PHENYLEPHRINE HYDROCHLORIDE 50 MCG: 10 INJECTION, SOLUTION INTRAMUSCULAR; INTRAVENOUS; SUBCUTANEOUS at 13:10

## 2017-10-04 RX ADMIN — FENTANYL CITRATE 50 MCG: 50 INJECTION, SOLUTION INTRAMUSCULAR; INTRAVENOUS at 11:46

## 2017-10-04 RX ADMIN — Medication 5 MG: at 12:04

## 2017-10-04 RX ADMIN — HYDROMORPHONE HYDROCHLORIDE 0.5 MG: 1 INJECTION, SOLUTION INTRAMUSCULAR; INTRAVENOUS; SUBCUTANEOUS at 15:14

## 2017-10-04 RX ADMIN — SODIUM CHLORIDE, POTASSIUM CHLORIDE, SODIUM LACTATE AND CALCIUM CHLORIDE: 600; 310; 30; 20 INJECTION, SOLUTION INTRAVENOUS at 10:29

## 2017-10-04 ASSESSMENT — LIFESTYLE VARIABLES: TOBACCO_USE: 1

## 2017-10-04 NOTE — ANESTHESIA CARE TRANSFER NOTE
Patient: Jorgito Serrano    Procedure(s):  RIGHT TOTAL HIP ARTHROPLASTY - Wound Class: I-Clean    Diagnosis: AVASCULAR NECROSIS OF RIGHT HIP  Diagnosis Additional Information: No value filed.    Anesthesia Type:   General, ETT     Note:  Airway :Face Mask  Patient transferred to:PACU  Comments: To pacu bed 9. Vss. 0.5 mg dilaudid given for pain of 5/10. Report given to RN assuming care of pt.      Vitals: (Last set prior to Anesthesia Care Transfer)    CRNA VITALS  10/4/2017 1320 - 10/4/2017 1400      10/4/2017             Pulse: 100    Ht Rate: (!)  0    SpO2: 100 %    Resp Rate (observed): (!)  7    Resp Rate (set): 10                Electronically Signed By: CARLY Blakely CRNA  October 4, 2017  2:00 PM

## 2017-10-04 NOTE — IP AVS SNAPSHOT
MRN:[id number]                      After Visit Summary   10/4/2017    Jorgito Serrano    MRN: [id number]           Thank you!     Thank you for choosing [facility] for your care. Our goal is always to provide you with excellent care. Hearing back from our patients is one way we can continue to improve our services. Please take a few minutes to complete the written survey that you may receive in the mail after you visit with us. Thank you!        Patient Information     Date Of Birth          1955        Designated Caregiver       Most Recent Value    Caregiver    Will someone help with your care after discharge? yes    Name of designated caregiver Lynne    Phone number of caregiver [phone]    Caregiver address [address] [place] [address]  [address]ve,  [address],  [place]      About your hospital stay     You were admitted on:  October 4, 2017 You last received care in the:  [facility] [address] [facility]    You were discharged on:  October 7, 2017       Who to Call     For medical emergencies, please call 911.  For non-urgent questions about your medical care, please call your primary care provider or clinic, [phone]  For questions related to your surgery, please call your surgery clinic        Attending Provider     Provider Specialty    Marquis Hernandez MD Surgery       Primary Care Provider Office Phone # Fax #    Karen Larios PA-C [phone]      After Care Instructions     Activity       Your activity upon discharge: activity as tolerated,  Lots  Of  Short  walks            Wound care and dressings       No change  needed                  Follow-up Appointments     Follow-up and recommended labs and tests        Follow up with me,  Marquis Hernandez, within 2 weeks  Post   Discharge                  Future tests that were ordered for you     POST-OP FOLLOW-UP VISIT       3 weeks post  discharge                  Further instructions from your care team       TOTAL HIP  "REPLACEMENT TAKE HOME INSTRUCTIONS  Your surgeon will answer any questions about your progress. General guidelines for your care are listed below. Your surgeon may give additional instructions for your care at home. Please follow them carefully.    Activity Level  1. Physical activity may be resumed gradually according to your comfort level and your surgeon s instructions. Follow your exercise program as instructed by your therapist. Do exercises at least twice daily. Refer to pages 19-22 of your \"Total Hip Replacement \" booklet for details.  2. Complete exercises two hours before bedtime to minimize the effect pain may have on sleep.  3. Do not cross legs. Do not bend past 90 .    Good Health Practices  1. Maintain an adequate fluid intake and eat a well balanced diet.  2. Be sure to include the basic food groups, such as dairy products, meat/fish, vegetables, and fruit. Each of these foods contribute to wound healing and increasing your strength.  3. Surgery, decreased activity and pain medication all contribute to a descrease in bowel activity that can result in constipation. It is recommended that you increase your liquid intake, add fiber to your diet, increase activity, and decrease pain medication use. If you have any problems, notify your physician.  4. Wear your anti-embolism stockings day and night until seen by your surgeon. Remove twice a day for one hour at a time. You may hand wash and air dry your stockings.  5. Notify your dentist of your total hip surgery and call your dentist one week before a dental appointment for antibiotics.  If dentist will not prescribe antibiotics call your surgeon to ask on next steps.      Incision/Dressing Care  1. Keep incision clean and dry.  2. Cover incision if you are still having drainage.  3.  If you have a waterproof dressing _____________________   Shower.    Things to Watch For  1. Check incision daily for increased redness, tenderness, swelling, or drainage " "along the incision line. If these occur, please notify your doctor. Also, call if you develop a fever above 101 .  2. Please notify your doctor if you experience any calf pain and/or if you have surgical pain not relieved by the pain medication prescribed by your doctor.          Pending Results     Date and Time Order Name Status Description    10/4/2017 1225 Surgical pathology exam In process             Statement of Approval     Ordered          10/06/17 1521  I have reviewed and agree with all the recommendations and orders detailed in this document.  EFFECTIVE NOW     Approved and electronically signed by:  Marquis Hernandez MD           10/06/17 1348  I have reviewed and agree with all the recommendations and orders detailed in this document.  EFFECTIVE NOW     Approved and electronically signed by:  Marquis Hernandez MD             Admission Information     Date & Time Provider Department Dept. Phone    10/4/2017 Marquis Hernandez MD [facility] [address] [facility] [phone]      Your Vitals Were     Blood Pressure Pulse Temperature Respirations Height Weight    108/68 (BP Location: Left arm) 87 98  F (36.7  C) (Oral) 18 1.753 m (5' 9\") 88 kg (194 lb)    Pulse Oximetry BMI (Body Mass Index)                95% 28.65 kg/m2          [employer] Information     [employer] gives you secure access to your electronic health record. If you see a primary care provider, you can also send messages to your care team and make appointments. If you have questions, please call your primary care clinic.  If you do not have a primary care provider, please call [phone] and they will assist you.        Care EveryWhere ID     This is your Care EveryWhere ID. This could be used by other organizations to access your [facility] medical records  [id number]        Equal Access to Services     AIDEN SINGLETARY AH: [place], bridgette roberts, carroll segovia " ah. So [facility] [phone].    ATENCIÓN: Si habla arleth, tiene a jurado disposición servicios gratuitos de asistencia lingüística. Tatyana farfan [phone].    We comply with applicable federal civil rights laws and [place] laws. We do not discriminate on the basis of race, color, national origin, age, disability, sex, sexual orientation, or gender identity.               Review of your medicines      START taking        Dose / Directions    acetaminophen 325 MG tablet   Commonly known as:  TYLENOL   Used for:  Acute pain of right shoulder        Dose:  975 mg   Take 3 tablets (975 mg) by mouth every 8 hours   Quantity:  100 tablet   Refills:  0       aspirin  MG EC tablet        Dose:  325 mg   Start taking on:  10/13/2017   Take 1 tablet (325 mg) by mouth 2 times daily for 19 days   Quantity:  60 tablet   Refills:  0       enoxaparin 40 MG/0.4ML injection   Commonly known as:  LOVENOX        Dose:  40 mg   Inject 0.4 mLs (40 mg) Subcutaneous daily for 7 days   Quantity:  2.8 mL   Refills:  0       order for DME        Equipment being ordered: Walker Wheels () and Walker () Treatment Diagnosis: Impaired gait   Quantity:  1 each   Refills:  0         CONTINUE these medicines which may have CHANGED, or have new prescriptions. If we are uncertain of the size of tablets/capsules you have at home, strength may be listed as something that might have changed.        Dose / Directions    * OXYCODONE HCL PO   This may have changed:  Another medication with the same name was added. Make sure you understand how and when to take each.        Dose:  5 mg   Take 5 mg by mouth daily as needed   Refills:  0       * oxyCODONE 5 MG IR tablet   Commonly known as:  ROXICODONE   This may have changed:  You were already taking a medication with the same name, and this prescription was added. Make sure you understand how and when to take each.        Dose:  5 mg   Take 1 tablet (5 mg) by mouth every 6 hours as needed for  moderate to severe pain   Quantity:  60 tablet   Refills:  0       * Notice:  This list has 2 medication(s) that are the same as other medications prescribed for you. Read the directions carefully, and ask your doctor or other care provider to review them with you.      CONTINUE these medicines which have NOT CHANGED        Dose / Directions    atorvastatin 20 MG tablet   Commonly known as:  LIPITOR   Used for:  Hyperlipidemia LDL goal <130        Dose:  20 mg   Take 1 tablet (20 mg) by mouth daily 1 Tablet Daily   Quantity:  90 tablet   Refills:  3       CALCIUM MAGNESIUM PO        Dose:  1 tablet   Take 1 tablet by mouth daily as needed   Refills:  0       FISH OIL PO        Dose:  1 tablet   Take 1 tablet by mouth daily as needed   Refills:  0       KEPPRA XR PO        Dose:  1000 mg   Take 1,000 mg by mouth At Bedtime (2 x 500 mg tablet = 1000 mg dose)   Refills:  0       XANAX PO        Dose:  0.75 mg   Take 0.75 mg by mouth At Bedtime (1.5 tablet x 0.5 mg = 0.75 mg dose)   Refills:  0         STOP taking     ALEVE PO                Where to get your medicines      These medications were sent to [facility] - [address]  [address] [facility] [address], [place] [address]     Phone:  [phone]     acetaminophen 325 MG tablet    aspirin  MG EC tablet         Some of these will need a paper prescription and others can be bought over the counter. Ask your nurse if you have questions.     Bring a paper prescription for each of these medications     enoxaparin 40 MG/0.4ML injection    order for DME    oxyCODONE 5 MG IR tablet                Protect others around you: Learn how to safely use, store and throw away your medicines at www.disposemymeds.org.             Medication List: This is a list of all your medications and when to take them. Check marks below indicate your daily home schedule. Keep this list as a reference.      Medications           Morning Afternoon Evening  Bedtime As Needed    acetaminophen 325 MG tablet   Commonly known as:  TYLENOL   Take 3 tablets (975 mg) by mouth every 8 hours   Last time this was given:  975 mg on 10/7/2017  9:07 AM                                aspirin  MG EC tablet   Take 1 tablet (325 mg) by mouth 2 times daily for 19 days   Start taking on:  10/13/2017                                atorvastatin 20 MG tablet   Commonly known as:  LIPITOR   Take 1 tablet (20 mg) by mouth daily 1 Tablet Daily   Last time this was given:  20 mg on 10/7/2017  9:07 AM                                CALCIUM MAGNESIUM PO   Take 1 tablet by mouth daily as needed                                enoxaparin 40 MG/0.4ML injection   Commonly known as:  LOVENOX   Inject 0.4 mLs (40 mg) Subcutaneous daily for 7 days   Last time this was given:  40 mg on 10/7/2017  9:08 AM                                FISH OIL PO   Take 1 tablet by mouth daily as needed                                KEPPRA XR PO   Take 1,000 mg by mouth At Bedtime (2 x 500 mg tablet = 1000 mg dose)   Last time this was given:  1,000 mg on 10/6/2017  9:54 PM                                order for DME   Equipment being ordered: Walker Wheels () and Walker () Treatment Diagnosis: Impaired gait                                * OXYCODONE HCL PO   Take 5 mg by mouth daily as needed   Last time this was given:  10 mg on 10/7/2017  9:07 AM                                * oxyCODONE 5 MG IR tablet   Commonly known as:  ROXICODONE   Take 1 tablet (5 mg) by mouth every 6 hours as needed for moderate to severe pain   Last time this was given:  10 mg on 10/7/2017  9:07 AM                                XANAX PO   Take 0.75 mg by mouth At Bedtime (1.5 tablet x 0.5 mg = 0.75 mg dose)   Last time this was given:  0.75 mg on 10/6/2017  9:54 PM                                * Notice:  This list has 2 medication(s) that are the same as other medications prescribed for you. Read the directions carefully,  and ask your doctor or other care provider to review them with you.

## 2017-10-04 NOTE — ANESTHESIA PREPROCEDURE EVALUATION
Anesthesia Evaluation     . Pt has had prior anesthetic.     History of anesthetic complications   - PONV        ROS/MED HX    ENT/Pulmonary:     (+)tobacco use, Past use , . .   (-) sleep apnea   Neurologic: Comment: Tremor, imbalance, myoclonic seizures, h/o ependymoma, dysphagia , insomnia, h/o meningitis , diplopia  , hand numbness      Cardiovascular: Comment: Atypical CP    (+) Dyslipidemia, ----. : . . . :. .      (-) PRATT   METS/Exercise Tolerance:     Hematologic:         Musculoskeletal:   (+) arthritis, , , -       GI/Hepatic:        (-) GERD   Renal/Genitourinary:         Endo:         Psychiatric:     (+) psychiatric history anxiety      Infectious Disease:         Malignancy:         Other:                     Physical Exam      Airway   Mallampati: II  TM distance: >3 FB  Neck ROM: full    Dental   (+) caps    Cardiovascular   Rhythm and rate: regular      Pulmonary    breath sounds clear to auscultation                    Anesthesia Plan      History & Physical Review  History and physical reviewed and following examination; no interval change.    ASA Status:  3 .        Plan for General and ETT     Additional equipment: Videolaryngoscope      Postoperative Care      Consents  Anesthetic plan, risks, benefits and alternatives discussed with:  Patient.  Use of blood products discussed: Yes.   .                          .  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    Allergies   Allergen Reactions     No Known Allergies      Past Medical History:   Diagnosis Date     Arthritis      Cerebellar mass      Depression      Depressive disorder, not elsewhere classified      Ependymoma (H)      Hyperlipidemia      Insomnia      Insomnia      Meningitis, bacterial      Migraine headache      Myoclonic seizure (H)      PONV (postoperative nausea and vomiting)      Tremors of nervous system      Past Surgical History:   Procedure Laterality Date     BRAIN  SURGERY      posterior tumor, x3        COLONOSCOPY  11/11/13     Social History   Substance Use Topics     Smoking status: Former Smoker     Packs/day: 0.50     Years: 25.00     Types: Cigarettes     Quit date: 11/25/2001     Smokeless tobacco: Never Used     Alcohol use Yes      Comment: daily 3-4 beers     Prior to Admission medications    Medication Sig Start Date End Date Taking? Authorizing Provider   Naproxen Sodium (ALEVE PO) Take 440 mg by mouth nightly as needed for moderate pain   Yes Reported, Patient   OXYCODONE HCL PO Take 5 mg by mouth daily as needed   Yes Reported, Patient   Calcium-Magnesium-Vitamin D (CALCIUM MAGNESIUM PO) Take 1 tablet by mouth daily as needed   Yes Reported, Patient   Omega-3 Fatty Acids (FISH OIL PO) Take 1 tablet by mouth daily as needed   Yes Reported, Patient   LevETIRAcetam (KEPPRA XR PO) Take 1,000 mg by mouth At Bedtime (2 x 500 mg tablet = 1000 mg dose)   Yes Reported, Patient   ALPRAZolam (XANAX PO) Take 0.75 mg by mouth At Bedtime (1.5 tablet x 0.5 mg = 0.75 mg dose)   Yes Reported, Patient   atorvastatin (LIPITOR) 20 MG tablet Take 1 tablet (20 mg) by mouth daily 1 Tablet Daily 6/19/17  Yes Karen Larios PA-C     Current Facility-Administered Medications Ordered in Epic   Medication Dose Route Frequency Last Rate Last Dose     lidocaine 1 % 1 mL  1 mL Other Q1H PRN         sodium chloride (PF) 0.9% PF flush 3 mL  3 mL Intracatheter Q8H         lactated ringers infusion   Intravenous Continuous         ceFAZolin sodium-dextrose (ANCEF) infusion 2 g  2 g Intravenous Pre-Op/Pre-procedure x 1 dose         ceFAZolin (ANCEF) 1 g vial to attach to  ml bag for ADULT or 50 ml bag for PEDS  1 g Intravenous See Admin Instructions         oxyCODONE (OxyCONTIN) 12 hr tablet 20 mg  20 mg Oral Once         tranexamic acid (CYKLOKAPRON) 2 g in NaCl 0.9 % 30 mL intra-articular solution  2 g INTRA-ARTICULAR Once         No current [facility]-ordered outpatient prescriptions on file.        lactated ringers       Wt Readings from Last 1 Encounters:   10/04/17 88 kg (194 lb)     Temp Readings from Last 1 Encounters:   10/04/17 36.1  C (97  F) (Temporal)     BP Readings from Last 6 Encounters:   10/04/17 120/80   09/29/17 116/78   09/15/17 124/80   08/23/17 107/63   06/19/17 120/70   07/05/16 100/64     Pulse Readings from Last 4 Encounters:   10/04/17 68   09/29/17 72   09/15/17 68   08/23/17 89     Resp Readings from Last 1 Encounters:   10/04/17 16     SpO2 Readings from Last 1 Encounters:   10/04/17 97%     Recent Labs   Lab Test  09/29/17   1153  06/19/17   1029  07/05/16   0752   12/14/12   0023   NA   --   142   --    --   140   POTASSIUM  4.5  4.6   --    --   4.3   CHLORIDE   --   106   --    --   100   CO2   --   28   --    --   28   ANIONGAP   --   8   --    --   11.4   GLC   --   111*  103*   < >  161*   BUN   --   13   --    --   11   CR  0.97  0.98   --    --   0.77   DAKOTA   --   9.1   --    --   9.2    < > = values in this interval not displayed.     Recent Labs   Lab Test  07/05/16   0752  05/29/14   0845  12/14/12   0023   11/08/12   0854   AST   --    --   87*   --   26   ALT  25  43  137*   < >  59   ALKPHOS   --    --   120   --   74   BILITOTAL   --    --   1.7*   --   1.8*    < > = values in this interval not displayed.     Recent Labs   Lab Test  09/29/17   1153  02/02/15   1009  12/14/12   0023   WBC   --   8.7  11.7*   HGB  15.3  16.1  12.1*   PLT   --   281  205     No results for input(s): ABO, RH in the last 30827 hours.  Recent Labs   Lab Test 10/23/12   INR  1.0      No results for input(s): TROPI in the last 10194 hours.  No results for input(s): PH, PCO2, PO2, HCO3 in the last 27838 hours.  No results for input(s): HCG in the last 73478 hours.  No results found for this or any previous visit (from the past 744 hour(s)).    RECENT LABS:   ECG:   ECHO:

## 2017-10-04 NOTE — IP AVS SNAPSHOT
[address] [facility]    [address] [place] [address]    Phone:  [phone]                                       After Visit Summary   10/4/2017    Jorgito Serrano    MRN: [id number]           After Visit Summary Signature Page     I have received my discharge instructions, and my questions have been answered. I have discussed any challenges I see with this plan with the nurse or doctor.    ..........................................................................................................................................  Patient/Patient Representative Signature      ..........................................................................................................................................  Patient Representative Print Name and Relationship to Patient    ..................................................               ................................................  Date                                            Time    ..........................................................................................................................................  Reviewed by Signature/Title    ...................................................              ..............................................  Date                                                            Time

## 2017-10-04 NOTE — BRIEF OP NOTE
[facility] Brief Operative Note    Pre-operative diagnosis: AVASCULAR NECROSIS OF RIGHT HIP   Post-operative diagnosis same   Procedure: Procedure(s):  RIGHT TOTAL HIP ARTHROPLASTY - Wound Class: I-Clean   Surgeon(s): Surgeon(s) and Role:     * Marquis Hernandez MD - Primary     Estimated blood loss: 300 mL    Specimens:   ID Type Source Tests Collected by Time Destination   A : FEMORAL HEAD Bone Bone SURGICAL PATHOLOGY EXAM Marquis Hernandez MD 10/4/2017 12:20 PM       Findings: avn

## 2017-10-05 ENCOUNTER — APPOINTMENT (OUTPATIENT)
Dept: PHYSICAL THERAPY | Facility: CLINIC | Age: 62
DRG: 470 | End: 2017-10-05
Attending: ORTHOPAEDIC SURGERY
Payer: COMMERCIAL

## 2017-10-05 LAB
ANION GAP SERPL CALCULATED.3IONS-SCNC: 10 MMOL/L (ref 3–14)
BUN SERPL-MCNC: 11 MG/DL (ref 7–30)
CALCIUM SERPL-MCNC: 8.2 MG/DL (ref 8.5–10.1)
CHLORIDE SERPL-SCNC: 104 MMOL/L (ref 94–109)
CO2 SERPL-SCNC: 25 MMOL/L (ref 20–32)
CREAT SERPL-MCNC: 0.87 MG/DL (ref 0.66–1.25)
GFR SERPL CREATININE-BSD FRML MDRD: 89 ML/MIN/1.7M2
GLUCOSE BLDC GLUCOMTR-MCNC: 117 MG/DL (ref 70–99)
GLUCOSE SERPL-MCNC: 109 MG/DL (ref 70–99)
HGB BLD-MCNC: 11.4 G/DL (ref 13.3–17.7)
PLATELET # BLD AUTO: 210 10E9/L (ref 150–450)
POTASSIUM SERPL-SCNC: 4 MMOL/L (ref 3.4–5.3)
SODIUM SERPL-SCNC: 139 MMOL/L (ref 133–144)

## 2017-10-05 PROCEDURE — 00000146 ZZHCL STATISTIC GLUCOSE BY METER IP

## 2017-10-05 PROCEDURE — 85018 HEMOGLOBIN: CPT | Performed by: ORTHOPAEDIC SURGERY

## 2017-10-05 PROCEDURE — 25000128 H RX IP 250 OP 636: Performed by: ORTHOPAEDIC SURGERY

## 2017-10-05 PROCEDURE — 97110 THERAPEUTIC EXERCISES: CPT | Mod: GP | Performed by: PHYSICAL THERAPIST

## 2017-10-05 PROCEDURE — 85049 AUTOMATED PLATELET COUNT: CPT | Performed by: ORTHOPAEDIC SURGERY

## 2017-10-05 PROCEDURE — 80048 BASIC METABOLIC PNL TOTAL CA: CPT | Performed by: ORTHOPAEDIC SURGERY

## 2017-10-05 PROCEDURE — 97116 GAIT TRAINING THERAPY: CPT | Mod: GP | Performed by: PHYSICAL THERAPIST

## 2017-10-05 PROCEDURE — 36415 COLL VENOUS BLD VENIPUNCTURE: CPT | Performed by: ORTHOPAEDIC SURGERY

## 2017-10-05 PROCEDURE — 97530 THERAPEUTIC ACTIVITIES: CPT | Mod: GP | Performed by: PHYSICAL THERAPIST

## 2017-10-05 PROCEDURE — 97161 PT EVAL LOW COMPLEX 20 MIN: CPT | Mod: GP | Performed by: PHYSICAL THERAPIST

## 2017-10-05 PROCEDURE — 25000132 ZZH RX MED GY IP 250 OP 250 PS 637: Performed by: ORTHOPAEDIC SURGERY

## 2017-10-05 PROCEDURE — 40000193 ZZH STATISTIC PT WARD VISIT: Performed by: PHYSICAL THERAPIST

## 2017-10-05 PROCEDURE — 12000007 ZZH R&B INTERMEDIATE

## 2017-10-05 RX ORDER — OXYCODONE HYDROCHLORIDE 5 MG/1
5 TABLET ORAL EVERY 6 HOURS PRN
Qty: 60 TABLET | Refills: 0 | Status: SHIPPED | OUTPATIENT
Start: 2017-10-05

## 2017-10-05 RX ADMIN — OXYCODONE HYDROCHLORIDE 10 MG: 10 TABLET, FILM COATED, EXTENDED RELEASE ORAL at 18:32

## 2017-10-05 RX ADMIN — OXYCODONE HYDROCHLORIDE 10 MG: 5 TABLET ORAL at 08:46

## 2017-10-05 RX ADMIN — OXYCODONE HYDROCHLORIDE 10 MG: 10 TABLET, FILM COATED, EXTENDED RELEASE ORAL at 05:53

## 2017-10-05 RX ADMIN — ACETAMINOPHEN 975 MG: 325 TABLET, FILM COATED ORAL at 02:48

## 2017-10-05 RX ADMIN — OXYCODONE HYDROCHLORIDE 10 MG: 5 TABLET ORAL at 00:38

## 2017-10-05 RX ADMIN — OXYCODONE HYDROCHLORIDE 10 MG: 5 TABLET ORAL at 20:19

## 2017-10-05 RX ADMIN — ACETAMINOPHEN 975 MG: 325 TABLET, FILM COATED ORAL at 11:33

## 2017-10-05 RX ADMIN — ALPRAZOLAM 0.75 MG: 0.5 TABLET ORAL at 22:00

## 2017-10-05 RX ADMIN — OXYCODONE HYDROCHLORIDE 10 MG: 5 TABLET ORAL at 03:37

## 2017-10-05 RX ADMIN — LEVETIRACETAM 1000 MG: 500 TABLET, FILM COATED, EXTENDED RELEASE ORAL at 22:00

## 2017-10-05 RX ADMIN — ATORVASTATIN CALCIUM 20 MG: 20 TABLET, FILM COATED ORAL at 08:46

## 2017-10-05 RX ADMIN — OXYCODONE HYDROCHLORIDE 10 MG: 5 TABLET ORAL at 13:32

## 2017-10-05 RX ADMIN — SODIUM CHLORIDE: 9 INJECTION, SOLUTION INTRAVENOUS at 13:09

## 2017-10-05 RX ADMIN — OXYCODONE HYDROCHLORIDE 10 MG: 5 TABLET ORAL at 16:29

## 2017-10-05 RX ADMIN — ACETAMINOPHEN 975 MG: 325 TABLET, FILM COATED ORAL at 18:31

## 2017-10-05 RX ADMIN — ENOXAPARIN SODIUM 40 MG: 40 INJECTION SUBCUTANEOUS at 08:46

## 2017-10-05 RX ADMIN — CEFAZOLIN SODIUM 2 G: 2 INJECTION, SOLUTION INTRAVENOUS at 02:48

## 2017-10-05 RX ADMIN — SODIUM CHLORIDE: 9 INJECTION, SOLUTION INTRAVENOUS at 02:48

## 2017-10-05 NOTE — ANESTHESIA POSTPROCEDURE EVALUATION
Patient: Jorgito Serrano    Procedure(s):  RIGHT TOTAL HIP ARTHROPLASTY - Wound Class: I-Clean    Diagnosis:AVASCULAR NECROSIS OF RIGHT HIP  Diagnosis Additional Information: No value filed.    Anesthesia Type:  General, ETT    Note:  Anesthesia Post Evaluation    Patient location during evaluation: PACU  Patient participation: Able to fully participate in evaluation  Level of consciousness: awake  Pain management: adequate  Airway patency: patent  Cardiovascular status: acceptable  Respiratory status: acceptable  Hydration status: acceptable  PONV: controlled     Anesthetic complications: None          Last vitals:  Vitals:    10/04/17 1815 10/04/17 [birth year]   BP: 105/73 107/78 102/79   Pulse:      Resp: 14 14 16   Temp:   36.9  C (98.5  F)   SpO2: 95% 92% 93%         Electronically Signed By: Clarice Burr MD  October 4, 2017  8:17 PM

## 2017-10-05 NOTE — PLAN OF CARE
Problem: Patient Care Overview  Goal: Plan of Care/Patient Progress Review  Outcome: Improving  Patients pain well controlled throughout the day. Pain rated at 2-5 throughout the day. Pain controlled with oxycodone, tylenol and ice packs. Patient up with assist of 1 with walker and gait belt.

## 2017-10-05 NOTE — PLAN OF CARE
Problem: Patient Care Overview  Goal: Plan of Care/Patient Progress Review  PT: Order received; Initial evaluation completed and treatment initiated POD #1 s/p R SHANE. Prior to admit patient reports being ambulatory with use of a cane for short distances and use of 4WW for longer distances. 3 recent falls secondary to buckling of R LE; Patient reports baseline sound hypersensitivity, balance impairment, and double vision from a brain tumor  5 years ago. Needs to look down to maintain balance while walking; Wife present and supportive. Lives in an apt with no stairs to access home environment.  Discharge Planner PT   Patient plan for discharge: return home with assist of spouse  Current status: Min A for bed mobility, tx, and gait x 80 feet with use of a rolling walker; able to tolerate all R SHANE ex's; educated in R hip precautions; Able to tolerate being up in wheelchair at end of session; VSS; /73.  Barriers to return to prior living situation: none; will have assist of spouse; no stairs  Recommendations for discharge: defer to POD #2  Rationale for recommendations: defer to POD #2       Entered by: Ekaterina Reyes 10/05/2017 10:24 AM

## 2017-10-05 NOTE — PLAN OF CARE
Problem: Patient Care Overview  Goal: Plan of Care/Patient Progress Review  Outcome: Improving  Pt is A & O x 4. Lungs sound clear, bowel sounds active, cms intact. Up with 1 and walker. Dressing was changed, c dc'd. Received oxycodone, tylenol, and oxycontin for pain. Will continue to monitor.

## 2017-10-05 NOTE — PLAN OF CARE
Problem: Patient Care Overview  Goal: Plan of Care/Patient Progress Review  Outcome: Improving  Pt up from  PACU @ 1515, oriented to room & unit. Pt A/O, VSS on RA. CMS intact, dressing c.d.i. HMV patent. Siobhan patent. Pain managed with IV dilaudid. Tolerating clears. Will continue to monitor.

## 2017-10-05 NOTE — PROGRESS NOTES
10/05/17 0916   Quick Adds   Type of Visit Initial PT Evaluation   Living Environment   Lives With spouse   Living Arrangements apartment   Home Accessibility no concerns   Number of Stairs to Enter Home 0   Number of Stairs Within Home 0   Transportation Available car;family or friend will provide  (not currently driving)   Self-Care   Usual Activity Tolerance fair   Current Activity Tolerance poor   Regular Exercise yes   Activity/Exercise Type walking;biking   Exercise Amount/Frequency 3-5 times/wk   Equipment Currently Used at Home cane, straight   Activity/Exercise/Self-Care Comment patient normally uses a cane; rolling walker for longer distance   Functional Level Prior   Ambulation 1-->assistive equipment  (CANE)   Transferring 1-->assistive equipment   Bathing 1-->assistive equipment  (grab bar; stool)   Cognition 1 - attention or memory deficits  (mild from brain tumor)   Fall history within last six months yes   Number of times patient has fallen within last six months 3  (last couple weeks)   Which of the above functional risks had a recent onset or change? ambulation;transferring   General Information   Onset of Illness/Injury or Date of Surgery - Date 10/04/17   Referring Physician Dr. Marquis Hernandez   Patient/Family Goals Statement return home with assist of wife   Pertinent History of Current Problem (include personal factors and/or comorbidities that impact the POC) Patient with Avascular Necrosis of the R hip who underwent R SHANE; PMHx of brain tumore with double vision; hearing sensitivity, and impaired balance   Precautions/Limitations fall precautions;right hip precautions  (Post/PL?)   Weight-Bearing Status - RLE weight-bearing as tolerated   General Observations wife present; hypersensitive to sound; wears ear plugs   General Info Comments Activity: ambulate with assist; /73   Cognitive Status Examination   Orientation orientation to person, place and time   Level of Consciousness alert    Follows Commands and Answers Questions 100% of the time   Personal Safety and Judgment intact   Memory impaired   Cognitive Comment mild deficits since brain tumor   Pain Assessment   Patient Currently in Pain Yes, see Vital Sign flowsheet  (2/10)   Integumentary/Edema   Integumentary/Edema Comments R post/PL hip incision   Posture    Posture Comments tends to be forward flexed at head to keep balance per patient report   Range of Motion (ROM)   ROM Comment R LE limited by recent surgery and mild pain; others appear WFL   Strength   Strength Comments R LE limited by recent surgery and pain   Bed Mobility   Bed Mobility Comments min A and cues to maintain hip precautions   Transfer Skills   Transfer Comments min/CGA and safety cues for sit<>stand and transfer to bedside wheelchair   Gait   Gait Comments able to ambulate > 50 feet with a rolling walker and min A of 1; wheelchair follow for safety given prior balance impairement and double vision   Balance   Balance Comments baseline impairment from prior brain tumor; 3 recent falls   Sensory Examination   Sensory Perception Comments intact per patient   Modality Interventions   Planned Modality Interventions Comments ice to R hip   General Therapy Interventions   Planned Therapy Interventions bed mobility training;gait training;ROM;strengthening;transfer training;progressive activity/exercise   Clinical Impression   Criteria for Skilled Therapeutic Intervention yes, treatment indicated   PT Diagnosis impaired gait/transfers   Influenced by the following impairments pain; R hip precautions; decreased R hip ROM/strength; baseline balance impairment   Functional limitations due to impairments impaired independence with functional mobility   Clinical Presentation Stable/Uncomplicated   Clinical Presentation Rationale min A or less for mobility; supportive living environment   Clinical Decision Making (Complexity) Low complexity   Therapy Frequency` 2 times/day  "  Predicted Duration of Therapy Intervention (days/wks) 3 days   Anticipated Equipment Needs at Discharge front wheeled walker  (has own 4WW)   Anticipated Discharge Disposition Home with Assist   Risk & Benefits of therapy have been explained Yes   Patient, Family & other staff in agreement with plan of care Yes   [facility] TM \"6 Clicks\"   2016, Trustees of [facility], under license to [employer].  All rights reserved.   6 Clicks Short Forms Basic Mobility Inpatient Short Form   [facility]  \"6 Clicks\" V.2 Basic Mobility Inpatient Short Form   1. Turning from your back to your side while in a flat bed without using bedrails? 3 - A Little   2. Moving from lying on your back to sitting on the side of a flat bed without using bedrails? 3 - A Little   3. Moving to and from a bed to a chair (including a wheelchair)? 3 - A Little   4. Standing up from a chair using your arms (e.g., wheelchair, or bedside chair)? 3 - A Little   5. To walk in hospital room? 3 - A Little   6. Climbing 3-5 steps with a railing? 2 - A Lot   Basic Mobility Raw Score (Score out of 24.Lower scores equate to lower levels of function) 17   Total Evaluation Time   Total Evaluation Time (Minutes) 15     "

## 2017-10-05 NOTE — PLAN OF CARE
Problem: Patient Care Overview  Goal: Plan of Care/Patient Progress Review  Outcome: Improving  Pt A&Ox4, CMS intact. Dangled X1 overnight, tolerated well. Taking oxycodone for pain. HMV intact. D/c hugo @0630, due to void.

## 2017-10-06 ENCOUNTER — APPOINTMENT (OUTPATIENT)
Dept: PHYSICAL THERAPY | Facility: CLINIC | Age: 62
DRG: 470 | End: 2017-10-06
Attending: ORTHOPAEDIC SURGERY
Payer: COMMERCIAL

## 2017-10-06 ENCOUNTER — APPOINTMENT (OUTPATIENT)
Dept: OCCUPATIONAL THERAPY | Facility: CLINIC | Age: 62
DRG: 470 | End: 2017-10-06
Attending: ORTHOPAEDIC SURGERY
Payer: COMMERCIAL

## 2017-10-06 LAB
GLUCOSE SERPL-MCNC: 99 MG/DL (ref 70–99)
HGB BLD-MCNC: 10.5 G/DL (ref 13.3–17.7)

## 2017-10-06 PROCEDURE — 40000133 ZZH STATISTIC OT WARD VISIT: Performed by: OCCUPATIONAL THERAPIST

## 2017-10-06 PROCEDURE — 97530 THERAPEUTIC ACTIVITIES: CPT | Mod: GP | Performed by: PHYSICAL THERAPIST

## 2017-10-06 PROCEDURE — 25000128 H RX IP 250 OP 636: Performed by: ORTHOPAEDIC SURGERY

## 2017-10-06 PROCEDURE — 85018 HEMOGLOBIN: CPT | Performed by: ORTHOPAEDIC SURGERY

## 2017-10-06 PROCEDURE — 25000132 ZZH RX MED GY IP 250 OP 250 PS 637: Performed by: ORTHOPAEDIC SURGERY

## 2017-10-06 PROCEDURE — 97116 GAIT TRAINING THERAPY: CPT | Mod: GP

## 2017-10-06 PROCEDURE — 40000193 ZZH STATISTIC PT WARD VISIT: Performed by: PHYSICAL THERAPIST

## 2017-10-06 PROCEDURE — 82947 ASSAY GLUCOSE BLOOD QUANT: CPT | Performed by: ORTHOPAEDIC SURGERY

## 2017-10-06 PROCEDURE — 36415 COLL VENOUS BLD VENIPUNCTURE: CPT | Performed by: ORTHOPAEDIC SURGERY

## 2017-10-06 PROCEDURE — 97165 OT EVAL LOW COMPLEX 30 MIN: CPT | Mod: GO | Performed by: OCCUPATIONAL THERAPIST

## 2017-10-06 PROCEDURE — 97110 THERAPEUTIC EXERCISES: CPT | Mod: GP | Performed by: PHYSICAL THERAPIST

## 2017-10-06 PROCEDURE — 12000007 ZZH R&B INTERMEDIATE

## 2017-10-06 PROCEDURE — 40000193 ZZH STATISTIC PT WARD VISIT

## 2017-10-06 PROCEDURE — 97110 THERAPEUTIC EXERCISES: CPT | Mod: GP

## 2017-10-06 PROCEDURE — 97116 GAIT TRAINING THERAPY: CPT | Mod: GP | Performed by: PHYSICAL THERAPIST

## 2017-10-06 PROCEDURE — 97535 SELF CARE MNGMENT TRAINING: CPT | Mod: GO | Performed by: OCCUPATIONAL THERAPIST

## 2017-10-06 RX ORDER — ACETAMINOPHEN 325 MG/1
975 TABLET ORAL EVERY 8 HOURS
Qty: 100 TABLET | Refills: 0 | Status: SHIPPED | OUTPATIENT
Start: 2017-10-06

## 2017-10-06 RX ADMIN — ATORVASTATIN CALCIUM 20 MG: 20 TABLET, FILM COATED ORAL at 08:33

## 2017-10-06 RX ADMIN — ENOXAPARIN SODIUM 40 MG: 40 INJECTION SUBCUTANEOUS at 08:46

## 2017-10-06 RX ADMIN — ACETAMINOPHEN 975 MG: 325 TABLET, FILM COATED ORAL at 18:27

## 2017-10-06 RX ADMIN — OXYCODONE HYDROCHLORIDE 10 MG: 10 TABLET, FILM COATED, EXTENDED RELEASE ORAL at 06:17

## 2017-10-06 RX ADMIN — OXYCODONE HYDROCHLORIDE 10 MG: 5 TABLET ORAL at 10:46

## 2017-10-06 RX ADMIN — OXYCODONE HYDROCHLORIDE 10 MG: 5 TABLET ORAL at 06:17

## 2017-10-06 RX ADMIN — ALPRAZOLAM 0.75 MG: 0.5 TABLET ORAL at 21:54

## 2017-10-06 RX ADMIN — ACETAMINOPHEN 975 MG: 325 TABLET, FILM COATED ORAL at 01:37

## 2017-10-06 RX ADMIN — OXYCODONE HYDROCHLORIDE 10 MG: 5 TABLET ORAL at 01:37

## 2017-10-06 RX ADMIN — OXYCODONE HYDROCHLORIDE 10 MG: 5 TABLET ORAL at 16:19

## 2017-10-06 RX ADMIN — LEVETIRACETAM 1000 MG: 500 TABLET, FILM COATED, EXTENDED RELEASE ORAL at 21:54

## 2017-10-06 RX ADMIN — ACETAMINOPHEN 975 MG: 325 TABLET, FILM COATED ORAL at 10:46

## 2017-10-06 NOTE — PLAN OF CARE
Problem: Patient Care Overview  Goal: Plan of Care/Patient Progress Review  Discharge Planner OT       Evaluation completed and treatment initiated. Pt. Resides w/his spouse in an a apartment, typically indep./mod. In dep. W/ADls/IADL's, works as . Pt. Has h/o brain tumor, with subsequent balance issues, double vision and sound sensitivity. Pt. uses a cane at baseline. Pt./spouse reports a standard toilet w/1-side support, tub/shower combo. w/grab bar, hand-held shower hose, stool.   Patient plan for discharge: Home w/assist  Current status: Overall, pt. doing well;ed. In hip precautions, verb.understanding;pt.completed sit-stand transfer w/CGA;demo. room mobility w/CGA;instructed in safe toilet transfer w/RTS w/armrests=SBA, rec. for home;instructed in safe  tub transfer techs.---completed step-over w/grab bar=CGA;pt./spouse related there is not room in bathroom to sit-swing LE's over edge of tub;pt.uses regular stool, would benefit from shower chair,provided DME resource for RTS/shower chair. Pt. Sat in WC for instruction in comp. Techs./AE for LE dressing--able to susana pants w/reacher + SBA;doffed socks w/reacher + SBA;donned socks using sock-aide, overall mn. A;used LHSH to susana shoes w/min. A;spouse will be able to assist as needed. Ed. In home WW/environmental safety and EC/WS techs.--issued handout.  Barriers to return to prior living situation: tub/shower combo.--will cont. To practice, will have assist, has grab bar,would benefit from shower bench  Recommendations for discharge: Home w/assist + DME/AE  Rationale for recommendations: Anticipate pt. to meet goals, be safe to DC home w/assist.       Entered by: Karen Atwood 10/06/2017 5:05 PM

## 2017-10-06 NOTE — PROGRESS NOTES
Jorgito Serrano  10/6/2017  POD #2    Doing well.  Pain well-controlled.  Tolerating physical therapy and rehabilitation well.  Temperatures:  Current - Temp: 99.2  F (37.3  C); Max - Temp  Av.8  F (37.1  C)  Min: 97.8  F (36.6  C)  Max: 99.2  F (37.3  C)  Pulse range: Pulse  Av.7  Min: 70  Max: 110  Blood pressure range: Systolic (24hrs), Av , Min:91 , Max:126   ; Diastolic (24hrs), Av, Min:63, Max:79    CMS: unchanged  Labs:   Results for orders placed or performed during the hospital encounter of 10/04/17 (from the past 24 hour(s))   Hemoglobin   Result Value Ref Range    Hemoglobin 10.5 (L) 13.3 - 17.7 g/dL   Glucose   Result Value Ref Range    Glucose 99 70 - 99 mg/dL       PLAN:  Discharge plan: home Saturday  After  Pt

## 2017-10-06 NOTE — PLAN OF CARE
Problem: Patient Care Overview  Goal: Plan of Care/Patient Progress Review  Outcome: Improving  Pt is A & O x 4. Lungs sound clear, bowel sounds active, cms intact. Up with 1 and walker. Dressing was changed. Received oxycodone and tylenol for pain. Will continue to monitor.

## 2017-10-06 NOTE — PLAN OF CARE
Problem: Patient Care Overview  Goal: Plan of Care/Patient Progress Review  Patient plan for discharge: return home with assist of spouse  Current status: Supine to sit with HOB elevated and min assist for R LE. Sit to/from stand with FWW and CGA. Amb 250 ft x 1 with FWW and CGA with close w/c follow. Pt reports increased R knee pain today but no knee buckling noted. Cues to slow down to increase safety. Pt sitting up in w/c at end of session with all needs in reach and chair alarm on.  Barriers to return to prior living situation: none; will have assist of spouse; no stairs  Recommendations for discharge: home with assist per the plan established by the Physical Therapist   Rationale for recommendations: Pt progressing well, will have assist at home. Has all necessary equipment at home.

## 2017-10-06 NOTE — OP NOTE
DATE OF PROCEDURE:  10/04/2017      PREOPERATIVE DIAGNOSIS:  Avascular necrosis, stage IV equivalent right hip.      POSTOPERATIVE DIAGNOSIS:  Avascular necrosis, stage IV equivalent right hip.      PROCEDURE:  Right total hip arthroplasty.        SURGEON:  Marquis Hernandez MD      ASSISTANT:   Aaron CAREY      ANESTHESIA:  General.      ESTIMATED BLOOD LOSS:  250-300 mL I believe, but have to check this.      COMPLICATIONS:  None.      IMPLANT:  DePuy Safford cup size 56,  2 screws.  Liner was neutral 4+4, femoral head component I believe was -2 and the stem was a size 7 high offset stem.      DESCRIPTION OF PROCEDURE:  Risks, options and alternatives were gone over with Jorgito Serrano, identification was made.  Proceeded in the operating room, timeout was requested, proceeded then.  Proceeded to push a small posterior approach superior over the axis of the greater trochanter and distal to the tip of it about 4 cm.  A total of 8 cm.  Sharp dissection was carried down.  Very lean gentleman.  Self-retaining retractor placed.  Identified the short external rotators after splitting the gluteus max.  Tied them with #5 FiberWire, reflected it posteriorly attached to the capsule.  I then proceeded to dislocate the hip out typical delamination was present.  Resected the head.  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      Proceeded then to skeletonize the acetabulum, preserving the capsule.  Labrum removed and reamed up to 55, cup placed in there, 2 screws.  Trial liner placed and brought the femoral neck up into the wound.  Reamed and rasped and tried to stay lateral as best we could.  Extremely hard bone.      Size 7 seemed to bottom out nicely.  Put the stem down.  Permanent liner was placed in the cup after ascertaining things were stable.  Slight varus, so I elected to accept it.  I thought we were going to have increased risk to fracture it.       We irrigated copiously.  Trials were done.  Extremely stable.  Went with -2 x 36.      At that point, cleaned things out, irrigated copiously, repaired the capsule and the short external rotators were reattached through drill hole in the trochanter as well as the gluteus medius tendon.  Things were stable.  The deep fascia with multiple interrupted 0 Ethibond followed by 0 V-Loc, 2-0 Vicryl and staples.  Drain was brought out.  Injected tranexamic acid in the joint.  The skin was closed with Dermabond.      We irrigated out, bed pillow applied, transferred awake and alert to the recovery room.      PLAN:  Routine PT, OT, weightbearing as tolerated, he is free to flex with knowing that he just has to avoid adduction and internal rotation with flexion.      The patient will be probably discharged home in 48-72 hours.  He does have a balance issue which he uses chronically a walker.         NANCY CHAUDHRY MD             D: 10/06/2017 03:00   T: 10/06/2017 15:05   MT: AYDEN#[id number]      Name:     DAVID HAY   MRN:      [id number]        Account:        [id number]   :      1955           Procedure Date: 10/04/2017      Document: [id number]

## 2017-10-06 NOTE — PLAN OF CARE
Problem: Patient Care Overview  Goal: Plan of Care/Patient Progress Review  Outcome: Improving  Pt A&Ox4, CMS intact. Assist X1/gb/walker. Voiding adequately in BR. IV SL. Taking oxycodone for pain. Progressing per plan of care.

## 2017-10-06 NOTE — PROGRESS NOTES
10/06/17 1528   Quick Adds   Type of Visit Initial Occupational Therapy Evaluation   Living Environment   Lives With spouse   Living Arrangements apartment   Home Accessibility no concerns;grab bars present (bathtub)   Number of Stairs to Enter Home 0   Number of Stairs Within Home 0   Transportation Available car;family or friend will provide  (pt. driving Myngle roque PTA)   Living Environment Comment Pt. has a tub/shower combo. w/grab bar;uses a stool, hand-held shower;pt./spouse report there is not room to sit-siwing LE's into tub,needs to step-over;standard toilet w/ 1-side support.   Self-Care   Usual Activity Tolerance fair   Current Activity Tolerance fair   Regular Exercise yes   Activity/Exercise Type biking;walking   Exercise Amount/Frequency 3-5 times/wk   Equipment Currently Used at Home cane, straight   Activity/Exercise/Self-Care Comment patient normally uses a cane; rolling walker for longer distance   Functional Level Prior   Ambulation 1-->assistive equipment   Transferring 1-->assistive equipment   Toileting 0-->independent   Bathing 1-->assistive equipment   Dressing 0-->independent   Eating 0-->independent   Communication 0-->understands/communicates without difficulty   Swallowing 0-->swallows foods/liquids without difficulty   Cognition 1 - attention or memory deficits   Fall history within last six months yes   Number of times patient has fallen within last six months 3   Which of the above functional risks had a recent onset or change? ambulation;transferring;toileting;bathing;dressing   General Information   Onset of Illness/Injury or Date of Surgery - Date 10/04/17   Referring Physician    Patient/Family Goals Statement Plans to DC home,spouse assist   Additional Occupational Profile Info/Pertinent History of Current Problem Patient with Avascular Necrosis of the R hip who underwent R SHANE; PMHx of brain tumor with double vision; hearing sensitivity, and impaired balance  "  Precautions/Limitations fall precautions;right hip precautions   Weight-Bearing Status - RLE weight-bearing as tolerated   General Observations Pt. sitting EOB upon arrival,spouse present;reports no pain;spouse present, answers most questions   General Info Comments Pt. works as a    Cognitive Status Examination   Orientation orientation to person, place and time   Memory (pt. reports memory is \"pretty good.\"Pt. w/h/o brain tumor.)   Cognitive Comment appears intact/baseline   Visual Perception   Visual Perception Comments has chronic double vision;does wear eye patche at times   Sensory Examination   Sensory Comments No numbness/tingling reported   Pain Assessment   Patient Currently in Pain No  (reports R hip muscle cramping)   Range of Motion (ROM)   ROM Comment WNL   Strength   Strength Comments WNL/good UE strength   Mobility   Bed Mobility Comments NT   Transfer Skill: Sit to Stand   Level of [place]: Sit/Stand contact guard   Physical Assist/Nonphysical Assist: Sit/Stand 1 person assist   Transfer Skill: Sit to Stand weight-bearing as tolerated   Assistive Device for Transfer: Sit/Stand rolling walker   Toilet Transfer   Toilet Transfer Comments standard toilet, 1-side support   Tub/Shower Transfer   Tub/Shower Transfer Comments tub/shower combo.   Balance   Balance Comments slightly unsteady, uses AD at baseline due to balance impairment from brain tumor, also has double vision   Lower Body Dressing   Level of [place]: Dress Lower Body minimum assist (75% patients effort)   Instrumental Activities of Daily Living (IADL)   Previous Responsibilities (spouse able to assist w/IADL's;pt. works at baseline)   Activities of Daily Living Analysis   Impairments Contributing to Impaired Activities of Daily Living balance impaired;cognition impaired;flexibility decreased;pain;post surgical precautions;ROM decreased;strength decreased  (decreased LE ROM/strength)   General Therapy " "Interventions   Planned Therapy Interventions ADL retraining  (monitor cognition--?memory impairment baseline)   Clinical Impression   Criteria for Skilled Therapeutic Interventions Met yes, treatment indicated   OT Diagnosis Decline in ADL performance   Influenced by the following impairments pain,impaired balance, post-surgical precs.,decreased flexibility,decreased LE strength/ROM   Assessment of Occupational Performance 1-3 Performance Deficits   Identified Performance Deficits Pt. currently below baseline for ADL performance   Clinical Decision Making (Complexity) Low complexity   Therapy Frequency daily   Predicted Duration of Therapy Intervention (days/wks) 2-3 days   Anticipated Equipment Needs at Discharge dressing equipment;reacher;shower chair;raised toilet seat   Anticipated Discharge Disposition Home;Home with Assist   Risks and Benefits of Treatment have been explained. Yes   Patient, Family & other staff in agreement with plan of care Yes   Clinical Impression Comments OT:Skilled OT intervention rec. while hosp. to assist pt. w/return to PLOF/achieve maximal safety/indep.w/ADl's.   [facility] TM \"6 Clicks\"   2016, Trustees of [facility], under license to [employer].  All rights reserved.   6 Clicks Short Forms Basic Mobility Inpatient Short Form   [facility] AM-PAC  \"6 Clicks\" V.2 Basic Mobility Inpatient Short Form   1. Turning from your back to your side while in a flat bed without using bedrails? 3 - A Little   2. Moving from lying on your back to sitting on the side of a flat bed without using bedrails? 3 - A Little   3. Moving to and from a bed to a chair (including a wheelchair)? 3 - A Little   4. Standing up from a chair using your arms (e.g., wheelchair, or bedside chair)? 3 - A Little   5. To walk in hospital room? 3 - A Little   6. Climbing 3-5 steps with a railing? 2 - A Lot   Basic Mobility Raw Score (Score out of 24.Lower scores equate to lower levels of " function) 17   Total Evaluation Time   Total Evaluation Time (Minutes) 10

## 2017-10-07 ENCOUNTER — APPOINTMENT (OUTPATIENT)
Dept: OCCUPATIONAL THERAPY | Facility: CLINIC | Age: 62
DRG: 470 | End: 2017-10-07
Attending: ORTHOPAEDIC SURGERY
Payer: COMMERCIAL

## 2017-10-07 ENCOUNTER — APPOINTMENT (OUTPATIENT)
Dept: PHYSICAL THERAPY | Facility: CLINIC | Age: 62
DRG: 470 | End: 2017-10-07
Attending: ORTHOPAEDIC SURGERY
Payer: COMMERCIAL

## 2017-10-07 VITALS
BODY MASS INDEX: 28.73 KG/M2 | OXYGEN SATURATION: 95 % | RESPIRATION RATE: 18 BRPM | SYSTOLIC BLOOD PRESSURE: 108 MMHG | WEIGHT: 194 LBS | HEIGHT: 69 IN | TEMPERATURE: 98 F | DIASTOLIC BLOOD PRESSURE: 68 MMHG | HEART RATE: 87 BPM

## 2017-10-07 LAB — PLATELET # BLD AUTO: 171 10E9/L (ref 150–450)

## 2017-10-07 PROCEDURE — 97530 THERAPEUTIC ACTIVITIES: CPT | Mod: GP | Performed by: PHYSICAL THERAPIST

## 2017-10-07 PROCEDURE — 97110 THERAPEUTIC EXERCISES: CPT | Mod: GP | Performed by: PHYSICAL THERAPIST

## 2017-10-07 PROCEDURE — 40000133 ZZH STATISTIC OT WARD VISIT: Performed by: OCCUPATIONAL THERAPIST

## 2017-10-07 PROCEDURE — 36415 COLL VENOUS BLD VENIPUNCTURE: CPT | Performed by: ORTHOPAEDIC SURGERY

## 2017-10-07 PROCEDURE — 97535 SELF CARE MNGMENT TRAINING: CPT | Mod: GO | Performed by: OCCUPATIONAL THERAPIST

## 2017-10-07 PROCEDURE — 85049 AUTOMATED PLATELET COUNT: CPT | Performed by: ORTHOPAEDIC SURGERY

## 2017-10-07 PROCEDURE — 97116 GAIT TRAINING THERAPY: CPT | Mod: GP | Performed by: PHYSICAL THERAPIST

## 2017-10-07 PROCEDURE — 25000132 ZZH RX MED GY IP 250 OP 250 PS 637: Performed by: ORTHOPAEDIC SURGERY

## 2017-10-07 PROCEDURE — 25000128 H RX IP 250 OP 636: Performed by: ORTHOPAEDIC SURGERY

## 2017-10-07 PROCEDURE — 40000193 ZZH STATISTIC PT WARD VISIT: Performed by: PHYSICAL THERAPIST

## 2017-10-07 RX ADMIN — ACETAMINOPHEN 975 MG: 325 TABLET, FILM COATED ORAL at 09:07

## 2017-10-07 RX ADMIN — ATORVASTATIN CALCIUM 20 MG: 20 TABLET, FILM COATED ORAL at 09:07

## 2017-10-07 RX ADMIN — OXYCODONE HYDROCHLORIDE 10 MG: 5 TABLET ORAL at 09:07

## 2017-10-07 RX ADMIN — ACETAMINOPHEN 975 MG: 325 TABLET, FILM COATED ORAL at 02:02

## 2017-10-07 RX ADMIN — ENOXAPARIN SODIUM 40 MG: 40 INJECTION SUBCUTANEOUS at 09:08

## 2017-10-07 NOTE — PLAN OF CARE
Problem: Patient Care Overview  Goal: Plan of Care/Patient Progress Review  Outcome: Improving  A/o x4. IVSL.  CMS intact.VSS ex, spike in temp, and HR. Trended down with Incentive spirometer and scheduled Advil. Regular diet tolerating. Dressing CDI, Ice applied. Pain managed with PRN oxycodone. WBAT. Up SBA walker gait belt to bathroom. D/c tomorrow to Home.

## 2017-10-07 NOTE — PLAN OF CARE
Problem: Patient Care Overview  Goal: Discharge Needs Assessment  Outcome: Adequate for Discharge Date Met:  10/07/17  Discharged without incident at 11:30, belongings with wife, all questions/concernns addressed.

## 2017-10-07 NOTE — PLAN OF CARE
Problem: Patient Care Overview  Goal: Plan of Care/Patient Progress Review  Discharge Planner PT   Patient plan for discharge: Home with wife and OP PT.    Current status: Min A for supine to sit.  Tends to IR right hip and lean left to compensate to move from long sitting in bed to EOB.  Instructed in hip precautions and cautioned not to do this with risk of dislocating.  Wife instructed as well.  Sit to and from stand with CGA and cueing for hip precautions.  Ambulated 200' with WW and CGA; complained of continued right knee pain; same as pre-op. Defer to physician.  Instructed in SHANE posterior precautions for car transfers as well.    Barriers to return to prior living situation: None  Recommendations for discharge: Home with wife and OP PT.  Rationale for recommendations: Patient lives in apartment; no stairs.  Wife present at all times initially.  Patient requiring CGA to slight Min A which wife can provide.  Given patient's prior balance deficits definitely recommend 24 hour supervision initially at home.  Also given balance deficits at baseline agree post-op PT is indicated to assess and treat.      Physical Therapy Discharge Summary     Reason for therapy discharge:    Discharged to home with outpatient therapy.     Progress towards therapy goal(s). See goals on Care Plan in [facility] electronic health record for goal details.  Goals partially met.  Barriers to achieving goals:   discharge from facility.     Therapy recommendation(s):    Continued therapy is recommended.  Rationale/Recommendations:  Given balance deficits prior to admit and compromised status from SHANE, PT is indicated to work on strength, balance, and functional mobility.             Entered by: Natalie Park 10/07/2017 9:46 AM

## 2017-10-07 NOTE — PLAN OF CARE
Problem: Patient Care Overview  Goal: Plan of Care/Patient Progress Review  Outcome: Improving  POD#:3. A&Ox3. UWA1/SBA. Pain controlled with oxycodone. Voiding adequately via BR. VSS on RA. IV SL, then discontinued. Patient discharged today, all questions and concerns discussed, belongings sent with spouse. Dressing changed, now CDI. CMS intact. Discharged home without incident.

## 2017-10-07 NOTE — DISCHARGE INSTRUCTIONS
"TOTAL HIP REPLACEMENT TAKE HOME INSTRUCTIONS  Your surgeon will answer any questions about your progress. General guidelines for your care are listed below. Your surgeon may give additional instructions for your care at home. Please follow them carefully.    Activity Level  1. Physical activity may be resumed gradually according to your comfort level and your surgeon s instructions. Follow your exercise program as instructed by your therapist. Do exercises at least twice daily. Refer to pages 19-22 of your \"Total Hip Replacement \" booklet for details.  2. Complete exercises two hours before bedtime to minimize the effect pain may have on sleep.  3. Do not cross legs. Do not bend past 90 .    Good Health Practices  1. Maintain an adequate fluid intake and eat a well balanced diet.  2. Be sure to include the basic food groups, such as dairy products, meat/fish, vegetables, and fruit. Each of these foods contribute to wound healing and increasing your strength.  3. Surgery, decreased activity and pain medication all contribute to a descrease in bowel activity that can result in constipation. It is recommended that you increase your liquid intake, add fiber to your diet, increase activity, and decrease pain medication use. If you have any problems, notify your physician.  4. Wear your anti-embolism stockings day and night until seen by your surgeon. Remove twice a day for one hour at a time. You may hand wash and air dry your stockings.  5. Notify your dentist of your total hip surgery and call your dentist one week before a dental appointment for antibiotics.  If dentist will not prescribe antibiotics call your surgeon to ask on next steps.      Incision/Dressing Care  1. Keep incision clean and dry.  2. Cover incision if you are still having drainage.  3.  If you have a waterproof dressing _____________________   Shower.    Things to Watch For  1. Check incision daily for increased redness, tenderness, swelling, or " drainage along the incision line. If these occur, please notify your doctor. Also, call if you develop a fever above 101 .  2. Please notify your doctor if you experience any calf pain and/or if you have surgical pain not relieved by the pain medication prescribed by your doctor.

## 2017-10-07 NOTE — PLAN OF CARE
Problem: Patient Care Overview  Goal: Plan of Care/Patient Progress Review  Discharge Planner OT       Patient plan for discharge: Home w/assist  Current status: Pt and wife particiapted in ed regarding home safety and fall prvention, they asked follow up questions from previous session regarding car transfers, AE options, and bathroom equipment set up. Pt completed transfer and ambulation with SBA-CGA, has halting gait due to pt reporting he is concerned about pivoting when in small spaces. Pt completed bed mobility and with leg , pt maintained precautions with no cues following ed on strategies with leg . Pt completed with MOD IND. Pt and wife state they feel ready for discharge. Pt ordered raised toilet seat with handles.   Barriers to return to prior living situation: tub/shower- they have equipment set up and wife will assist  Recommendations for discharge: Home w/assist + DME/AE  Rationale for recommendations: Pt and spouse demonstrate ability to follow recommendations and have made appropriate AE arrangements.        Entered by: Rama Arreola 10/07/2017 10:05 AM    Occupational Therapy Discharge Summary    Reason for therapy discharge:    Discharged to home.    Progress towards therapy goal(s). See [facility] on [facility] for goal details.  Goals partially met.  Barriers to achieving goals:   discharge from facility.    Therapy recommendation(s):    No further therapy is recommended.

## 2017-10-07 NOTE — PLAN OF CARE
Problem: Patient Care Overview  Goal: Plan of Care/Patient Progress Review  Outcome: Improving  A&O.  VSS, RA.  Temp elevated goes down with use of IS.  CMS intact.  Dressing CDI. Denied pain.  Tolerating regular diet.  Up x 1 with walker.  IV SL.  D/C today to home.

## 2017-10-09 ENCOUNTER — TELEPHONE (OUTPATIENT)
Dept: FAMILY MEDICINE | Facility: OTHER | Age: 62
End: 2017-10-09

## 2017-10-09 NOTE — TELEPHONE ENCOUNTER
Reason for follow up call: Jorgito JORGE Zach appeared on our list for being seen in and recenlty discharge from the Hospital.    Chief Complaint   Patient presents with     Hospital F/U     H/O Total Hip Arthroplasty, Right       Encounter routed for Clinic RN to call for follow up

## 2017-10-09 NOTE — TELEPHONE ENCOUNTER
ED / Discharge Outreach Protocol    Patient Contact    Attempt # 1    Was call answered?  No.  Left message on voicemail with information to call me back.    Benton Walden, RN, BSN

## 2017-10-10 LAB — COPATH REPORT: NORMAL

## 2017-10-10 ASSESSMENT — ENCOUNTER SYMPTOMS
FALLS: 1
FOCAL WEAKNESS: 0
ABDOMINAL PAIN: 0
SENSORY CHANGE: 0

## 2017-10-10 NOTE — TELEPHONE ENCOUNTER
"Hospital/TCU/ED for chronic condition Discharge Protocol    \"Hi, my name is Odette Muñiz, a registered nurse, and I am calling from [facility].  I am calling to follow up and see how things are going for you after your recent emergency visit/hospital/TCU stay.\"    Tell me how you are doing now that you are home?\" \"Doing awesome.\"      Discharge Instructions    \"Let's review your discharge instructions.  What is/are the follow-up recommendations?  Pt. Response: has therapy scheduled and surgeon at [facility]     \"Has an appointment with your primary care provider been scheduled?\"   Yes. (confirm)    \"When you see the provider, I would recommend that you bring your medications with you.\"    Medications    \"Tell me what changed about your medicines when you discharged?\"    Changes to chronic meds?    0-1    \"What questions do you have about your medications?\"    None     New diagnoses of heart failure, COPD, diabetes, or MI?    No       Medication reconciliation completed? Yes  Was MTM referral placed (*Make sure to put transitions as reason for referral)?   No    Call Summary    \"What questions or concerns do you have about your recent visit and your follow-up care?\"     none    \"If you have questions or things don't continue to improve, we encourage you contact us through the main clinic number (give number).  Even if the clinic is not open, triage nurses are available 24/7 to help you.     We would like you to know that our clinic has extended hours (provide information).  We also have urgent care (provide details on closest location and hours/contact info)\"      \"Thank you for your time and take care!\"       Odette Muñiz, RN, BSN     "

## 2017-10-11 NOTE — PROGRESS NOTES
"Musculoskeletal Problem   This is a chronic problem. The current episode started more than 1 month ago. The problem occurs daily. The problem has been rapidly worsening. Pertinent negatives include no abdominal pain. The symptoms are aggravated by standing and walking.       Past Medical History:   Diagnosis Date     Arthritis      Cerebellar mass      Depression      Depressive disorder, not elsewhere classified      Ependymoma (H)      Hyperlipidemia      Insomnia      Insomnia      Meningitis, bacterial      Migraine headache      Myoclonic seizure (H)      PONV (postoperative nausea and vomiting)      Tremors of nervous system        Review of Systems   Gastrointestinal: Negative for abdominal pain.   Musculoskeletal: Positive for falls.   Neurological: Negative for sensory change and focal weakness.       /80 (BP Location: Right arm, Patient Position: Chair, Cuff Size: Adult Regular)  Pulse 68  Temp 97.8  F (36.6  C) (Oral)  Ht 5' 9\" (1.753 m)  Wt 196 lb 12.8 oz (89.3 kg)  SpO2 98%  BMI 29.06 kg/m2      Physical Exam   Constitutional: He is oriented to person, place, and time. No distress.   Musculoskeletal:   Limited flexion, extension, and abduction at both hip joints due to pain   Neurological: He is alert and oriented to person, place, and time.   Intact motor and sensory function of both lower extremities   Vitals reviewed.        ICD-10-CM    1. Hip pain, bilateral M25.551 ORTHOPEDICS ADULT REFERRAL    M25.552 ORTHOPEDICS ADULT REFERRAL     **please refer to HPI for status of conditions      Patient Instructions   Follow up with Orthopedics.      "

## 2017-10-12 PROBLEM — M25.552 HIP PAIN, LEFT: Status: RESOLVED | Noted: 2017-09-01 | Resolved: 2017-10-12

## 2017-10-12 PROBLEM — M25.562 KNEE PAIN, LEFT: Status: RESOLVED | Noted: 2017-09-01 | Resolved: 2017-10-12

## 2017-10-12 PROBLEM — M25.511 SHOULDER PAIN, RIGHT: Status: RESOLVED | Noted: 2017-09-01 | Resolved: 2017-10-12

## 2017-10-12 NOTE — PROGRESS NOTES
Subjective:    HPI                    Objective:    System    Physical Exam    General     ROS    Assessment/Plan:      DISCHARGE REPORT    Progress reporting period is from 9/1 to 9/18/17.       SUBJECTIVE  Subjective changes noted by patient:   Knee has buckling 3 times since I saw you.     Current pain level is  3/10.     Previous pain level was 9/10.   Changes in function:  Yes (See Goal flowsheet attached for changes in current functional level  Adverse reaction to treatment or activity: activity - getting up in the morning he has noticed an increase in knee buckling    OBJECTIVE  Changes noted in objective findings:  Yes, Centralization of low back pain following repeated extension. Min Fasiculation noted at right quad. Positive crossed straight leg raise on L. Left Slump test positive for symptom reproduction on the right side. Right knee lacking 1 degree from full extension.      ASSESSMENT/PLAN  Updated problem list and treatment plan: Diagnosis 1:  Left Shoulder, right hip, and right knee pain.  STG/LTGs have been met or progress has been made towards goals:  Yes (See Goal flow sheet completed today.)  Assessment of Progress: Patient has not returned to therapy. Patient followed up with an orthopedic doctor following increase in knee buckling.  Self Management Plans:  Patient has been instructed in a home treatment program.      Recommendations:  This patient is ready to be discharged from therapy and continue their home treatment program.    Please refer to the daily flowsheet for treatment today, total treatment time and time spent performing 1:1 timed codes.

## 2017-10-23 NOTE — DISCHARGE SUMMARY
[facility] Discharge Summary    Jorgito Serrano MRN# [id number]   Age: 62 year old YOB: 1955     Date of Admission:  10/4/2017  Date of Discharge::  10/7/2017 11:15 AM  Admitting Physician:  Marquis Hernandez MD  Discharge Physician:  Marquis Hernandez MD     Home clinic:          Admission Diagnoses:   AVASCULAR NECROSIS OF RIGHT HIP  H/O total hip arthroplasty, right          Discharge Diagnosis:   AVASCULAR NECROSIS OF RIGHT HIP  H/O total hip arthroplasty, right          Procedures:   Procedure(s): Total hip arthoplasty (Right)       No other significant procedures performed during this admission           Medications Prior to Admission:     No prescriptions prior to admission.             Discharge Medications:     Discharge Medication List as of 10/7/2017 10:50 AM      START taking these medications    Details   order for DME Equipment being ordered: Walker Wheels () and Walker ()  Treatment Diagnosis: Impaired gaitDisp-1 each, R-0, Local Print      acetaminophen (TYLENOL) 325 MG tablet Take 3 tablets (975 mg) by mouth every 8 hours, Disp-100 tablet, R-0, E-Prescribe      !! oxyCODONE (ROXICODONE) 5 MG IR tablet Take 1 tablet (5 mg) by mouth every 6 hours as needed for moderate to severe pain, Disp-60 tablet, R-0, Local Print      aspirin  MG EC tablet Take 1 tablet (325 mg) by mouth 2 times daily for 19 days, Disp-60 tablet, R-0, E-Prescribe       !! - Potential duplicate medications found. Please discuss with provider.      CONTINUE these medications which have CHANGED    Details   enoxaparin (LOVENOX) 40 MG/0.4ML injection Inject 0.4 mLs (40 mg) Subcutaneous daily for 7 days, Disp-2.8 mL, R-0, Local Print         CONTINUE these medications which have NOT CHANGED    Details   !! OXYCODONE HCL PO Take 5 mg by mouth daily as needed, Historical      Calcium-Magnesium-Vitamin D (CALCIUM MAGNESIUM PO) Take 1 tablet by mouth daily as needed, Historical      Omega-3 Fatty Acids  (FISH OIL PO) Take 1 tablet by mouth daily as needed, Historical      LevETIRAcetam (KEPPRA XR PO) Take 1,000 mg by mouth At Bedtime (2 x 500 mg tablet = 1000 mg dose), Historical      ALPRAZolam (XANAX PO) Take 0.75 mg by mouth At Bedtime (1.5 tablet x 0.5 mg = 0.75 mg dose), Historical      atorvastatin (LIPITOR) 20 MG tablet Take 1 tablet (20 mg) by mouth daily 1 Tablet Daily, Disp-90 tablet, R-3, E-Prescribe       !! - Potential duplicate medications found. Please discuss with provider.      STOP taking these medications       Naproxen Sodium (ALEVE PO) Comments:   Reason for Stopping:                     Consultations:   No consultations were requested during this admission          Brief History of Illness:   This patient was a 62 year old male with a known history of osteoarthritis.  He underwent a period of non-operative treatment which only provided mild and intermittent relief.  After a lengthy discussion and consultation with Dr. johns, the patient chose to undergo a right side hip arthroplasty.  He was explained the risks,complications, and benefits of the procedure and elected to have the surgical procedure.  Patient was cleared by his primary care physician for joint replacement.           Hospital Course:   The patient tolerated the procedure well and was taken to postop recovery in stable condition.  Please refer to the full operative note for complete details.   In recovery, he had a post-operative hemoglobin of not checked  and was noted to be motor and neurovascular intact.  Post-operative films show components in excellent position.     On post-operative day 1, patient's wound was checked and noted to be healing well..  The drain output was within normal limits..  Patient had adequate pain control and was prescribed physical therapy.  He was given 24 hrs of perioperative antibiotics.  Patient had motor strength of 5/5 on the both sides.  Patient was neurovascularly intact in the both sides lower  extremity. There were no complications throughout the hospital course as the patient passed physical therapy/occupational therapy on post-operative day #0.  The drain was pulled on post-operative day #1.  His discharge hemoglobin was see epic and the patient did not require a blood transfusion.  He was followed by ortho during this hospital visit to manage his medical problems.     Upon discharge, the patient was seen by Dr. Lopez and all questions were answered.  He was discharged on home medications as outlined in medication reconciliation list outlined below.  The patient has instructions that if he has increased pain, fever, erythema, swelling or drainage to immediately call.          Discharge Instructions and Follow-Up:   Discharge diet: Regular   Discharge activity: Activity as tolerated   Discharge follow-up: Follow up with me in 2 weeks weeks   Wound care: Apply bandage daily  Keep wound clean and dry  May get incision wet in shower but do not soak or scrub           Discharge Disposition:   Discharged to home      Attestation:  I have reviewed today's vital signs, notes, medications, labs and imaging.    Marquis Hernandez MD

## 2018-01-19 DIAGNOSIS — F41.9 ANXIETY: Primary | ICD-10-CM

## 2018-01-23 DIAGNOSIS — F41.9 ANXIETY: ICD-10-CM

## 2018-01-23 RX ORDER — ALPRAZOLAM 0.5 MG
TABLET ORAL
Qty: 45 TABLET | Refills: 1 | Status: SHIPPED | OUTPATIENT
Start: 2018-01-23 | End: 2018-03-21

## 2018-01-23 NOTE — TELEPHONE ENCOUNTER
Patient is calling to check on the status of this request, patient is out of medication.    Thank you Miriam

## 2018-01-23 NOTE — TELEPHONE ENCOUNTER
Requested Prescriptions   Pending Prescriptions Disp Refills     ALPRAZolam (XANAX) 0.5 MG tablet [Pharmacy Med Name: ALPRAZOLAM 0.5 MG TABLET] 45 tablet      Sig: TAKE 1 TABLET BY MOUTH TWICE A DAY AS NEEDED FOR ANXIETY    There is no refill protocol information for this order        Last ov 09/29/2017    Routing refill request to provider for review/approval because:  Drug not on the [facility] refill protocol   Listed as historical    Benton Walden RN, BSN

## 2018-01-24 NOTE — TELEPHONE ENCOUNTER
Spoke with pt and gave information below. Provider/RN please deny medication and close encounter.    Rama Ruiz CMA ([facility])

## 2018-01-25 RX ORDER — ALPRAZOLAM 0.5 MG
TABLET ORAL
Qty: 45 TABLET | OUTPATIENT
Start: 2018-01-25

## 2018-03-21 DIAGNOSIS — F41.9 ANXIETY: ICD-10-CM

## 2018-03-22 NOTE — TELEPHONE ENCOUNTER
Requested Prescriptions   Pending Prescriptions Disp Refills     ALPRAZolam (XANAX) 0.5 MG tablet [Pharmacy Med Name: ALPRAZOLAM 0.5 MG TABLET] 45 tablet 1     Sig: TAKE 1 TABLET BY MOUTH TWICE A DAY AS NEEDED    There is no refill protocol information for this order        Last ov 09/29/2017    Routing refill request to provider for review/approval because:  Drug not on the [facility] refill protocol     Benton Walden RN, BSN

## 2018-03-26 RX ORDER — ALPRAZOLAM 0.5 MG
TABLET ORAL
Qty: 45 TABLET | Refills: 1 | Status: SHIPPED | OUTPATIENT
Start: 2018-03-26 | End: 2018-05-19

## 2018-05-19 DIAGNOSIS — F41.9 ANXIETY: ICD-10-CM

## 2018-05-21 RX ORDER — ALPRAZOLAM 0.5 MG
TABLET ORAL
Qty: 45 TABLET | Refills: 0 | Status: SHIPPED | OUTPATIENT
Start: 2018-05-21

## 2018-05-21 NOTE — TELEPHONE ENCOUNTER
Requested Prescriptions   Pending Prescriptions Disp Refills     ALPRAZolam (XANAX) 0.5 MG tablet [Pharmacy Med Name: ALPRAZOLAM 0.5 MG TABLET] 45 tablet 1     Sig: TAKE 1 TABLET BY MOUTH TWICE DAILY AS NEEDED.    There is no refill protocol information for this order

## 2019-12-08 ENCOUNTER — HEALTH MAINTENANCE LETTER (OUTPATIENT)
Age: 64
End: 2019-12-08

## 2023-02-21 ENCOUNTER — HOSPITAL ENCOUNTER (EMERGENCY)
Facility: CLINIC | Age: 68
Discharge: HOME | End: 2023-02-21
Attending: EMERGENCY MEDICINE | Admitting: EMERGENCY MEDICINE
Payer: COMMERCIAL

## 2023-02-21 ENCOUNTER — APPOINTMENT (OUTPATIENT)
Dept: GENERAL RADIOLOGY | Facility: CLINIC | Age: 68
End: 2023-02-21
Attending: EMERGENCY MEDICINE
Payer: COMMERCIAL

## 2023-02-21 VITALS
TEMPERATURE: 97.9 F | RESPIRATION RATE: 18 BRPM | HEART RATE: 62 BPM | OXYGEN SATURATION: 97 % | SYSTOLIC BLOOD PRESSURE: 121 MMHG | DIASTOLIC BLOOD PRESSURE: 78 MMHG

## 2023-02-21 DIAGNOSIS — R07.9 CHEST PAIN, UNSPECIFIED TYPE: ICD-10-CM

## 2023-02-21 LAB
ANION GAP SERPL CALCULATED.3IONS-SCNC: 10 MMOL/L (ref 7–15)
BASOPHILS # BLD AUTO: 0.1 10E3/UL (ref 0–0.2)
BASOPHILS NFR BLD AUTO: 1 %
BUN SERPL-MCNC: 14.2 MG/DL (ref 8–23)
CALCIUM SERPL-MCNC: 9 MG/DL (ref 8.8–10.2)
CHLORIDE SERPL-SCNC: 105 MMOL/L (ref 98–107)
CREAT SERPL-MCNC: 0.96 MG/DL (ref 0.67–1.17)
DEPRECATED HCO3 PLAS-SCNC: 25 MMOL/L (ref 22–29)
EOSINOPHIL # BLD AUTO: 0.1 10E3/UL (ref 0–0.7)
EOSINOPHIL NFR BLD AUTO: 1 %
ERYTHROCYTE [DISTWIDTH] IN BLOOD BY AUTOMATED COUNT: 11.7 % (ref 10–15)
GFR SERPL CREATININE-BSD FRML MDRD: 88 ML/MIN/1.73M2
GLUCOSE SERPL-MCNC: 107 MG/DL (ref 70–99)
HCT VFR BLD AUTO: 44 % (ref 40–53)
HGB BLD-MCNC: 15.1 G/DL (ref 13.3–17.7)
IMM GRANULOCYTES # BLD: 0 10E3/UL
IMM GRANULOCYTES NFR BLD: 0 %
LYMPHOCYTES # BLD AUTO: 2 10E3/UL (ref 0.8–5.3)
LYMPHOCYTES NFR BLD AUTO: 26 %
MCH RBC QN AUTO: 33.9 PG (ref 26.5–33)
MCHC RBC AUTO-ENTMCNC: 34.3 G/DL (ref 31.5–36.5)
MCV RBC AUTO: 99 FL (ref 78–100)
MONOCYTES # BLD AUTO: 0.5 10E3/UL (ref 0–1.3)
MONOCYTES NFR BLD AUTO: 7 %
NEUTROPHILS # BLD AUTO: 5 10E3/UL (ref 1.6–8.3)
NEUTROPHILS NFR BLD AUTO: 65 %
NRBC # BLD AUTO: 0 10E3/UL
NRBC BLD AUTO-RTO: 0 /100
PLATELET # BLD AUTO: 223 10E3/UL (ref 150–450)
POTASSIUM SERPL-SCNC: 4.4 MMOL/L (ref 3.4–5.3)
RBC # BLD AUTO: 4.45 10E6/UL (ref 4.4–5.9)
SODIUM SERPL-SCNC: 140 MMOL/L (ref 136–145)
TROPONIN T SERPL HS-MCNC: 6 NG/L
TROPONIN T SERPL HS-MCNC: 7 NG/L
WBC # BLD AUTO: 7.6 10E3/UL (ref 4–11)

## 2023-02-21 PROCEDURE — 93005 ELECTROCARDIOGRAM TRACING: CPT

## 2023-02-21 PROCEDURE — 84484 ASSAY OF TROPONIN QUANT: CPT | Mod: 91 | Performed by: EMERGENCY MEDICINE

## 2023-02-21 PROCEDURE — 93005 ELECTROCARDIOGRAM TRACING: CPT | Mod: 76

## 2023-02-21 PROCEDURE — 250N000013 HC RX MED GY IP 250 OP 250 PS 637: Performed by: EMERGENCY MEDICINE

## 2023-02-21 PROCEDURE — 99285 EMERGENCY DEPT VISIT HI MDM: CPT | Mod: 25

## 2023-02-21 PROCEDURE — 71046 X-RAY EXAM CHEST 2 VIEWS: CPT

## 2023-02-21 PROCEDURE — 84484 ASSAY OF TROPONIN QUANT: CPT | Performed by: EMERGENCY MEDICINE

## 2023-02-21 PROCEDURE — 80048 BASIC METABOLIC PNL TOTAL CA: CPT | Performed by: EMERGENCY MEDICINE

## 2023-02-21 PROCEDURE — 36415 COLL VENOUS BLD VENIPUNCTURE: CPT | Performed by: EMERGENCY MEDICINE

## 2023-02-21 PROCEDURE — 85025 COMPLETE CBC W/AUTO DIFF WBC: CPT | Performed by: EMERGENCY MEDICINE

## 2023-02-21 RX ORDER — ASPIRIN 325 MG
325 TABLET ORAL ONCE
Status: COMPLETED | OUTPATIENT
Start: 2023-02-21 | End: 2023-02-21

## 2023-02-21 RX ADMIN — ASPIRIN 325 MG ORAL TABLET 325 MG: 325 PILL ORAL at 12:25

## 2023-02-21 ASSESSMENT — ENCOUNTER SYMPTOMS
CHILLS: 0
FEVER: 0
COUGH: 0
NUMBNESS: 0
SHORTNESS OF BREATH: 0
BACK PAIN: 1

## 2023-02-21 ASSESSMENT — ACTIVITIES OF DAILY LIVING (ADL)
ADLS_ACUITY_SCORE: 35
ADLS_ACUITY_SCORE: 35

## 2023-02-21 NOTE — ED TRIAGE NOTES
Patient presents to the ED reporting left sided chest pain. States began as intermittent pain this morning and is now more constant. History of quadruple bypass.

## 2023-02-21 NOTE — ED PROVIDER NOTES
History     Chief Complaint:  Chest Pain       The history is provided by the patient.      Jorgito Serrano is a 67 year old male with a history of CAD s/p CABG x4 who presents with chest pain. The patient reports developing non radiating left sided chest pain while standing at his desk working approximately two hours ago. He describes the pain as a burning sensation, and states that it lasted for two hours then resolved without intervention. He notes that he has some nerve damage from his cardiac surgical history, however this pain felt deep, beneath the sternum. He denies any history of GERD or associated shortness of breath. He reports occasional back pain. He denies any numbness/tingling or any recent fever, chills, cough, rash or pain/swelling in his legs. He reports taking a daily aspirin, however he forgot to take it today. Lastly, he states that he has not had pain like this since before his bypass surgery.     Independent Historian:   Spouse/Partner - They report that the pain was associated with lightheadedness. They also report that the patient has been fatigued lately.    Review of External Notes:   Reviewed hospital admission from 8/25/2021  Reviewed hospital admission from  3/7/2022, reassuring LexiScan    ROS:  Review of Systems   Constitutional: Negative for chills and fever.   Respiratory: Negative for cough and shortness of breath.    Cardiovascular: Positive for chest pain. Negative for leg swelling.   Musculoskeletal: Positive for back pain.   Skin: Negative for rash.   Neurological: Negative for numbness.   All other systems reviewed and are negative.      Allergies:  No Known Allergies     Medications:    Atorvastatin  Keppra  Omega 3 fatty acids  Aspirin   Lamictal    Past Medical History:    Arthritis  Cerebellar mass  Depression  Ependymoma  Hyperlipidemia  Insomnia  Meningitis, bacterial   Migraine  Myoclonic seizure  PONV (postoperative nausea and vomiting)  Tremors of nervous system  Male  hypogonadism  Erectile dysfunction  Non-ST elevation myocardial infarction (NSTEMI)  Unstable angina pectoris  Habitual alcohol use  CVD (cardiovascular disease)  Attention deficit hyperactivity disorder (ADHD), predominantly inattentive type  Stenosing tenosynovitis of finger of right hand  Anxiety  Tinea versicolor  Disc degeneration, lumbar   Cataract senile    Past Surgical History:    Arthroplasty hip  Brain surgery  Colonoscopy  CABG x4  Vasectomy    Family History:    Father: prostate cancer  Mother: cancer  Brother: cancer, MS    Social History:  Presents to the ED with his wife and granddaughter  PCP: Karen Larios     Physical Exam     Patient Vitals for the past 24 hrs:   BP Temp Pulse Resp SpO2   02/21/23 1530 121/78 -- 62 -- 97 %   02/21/23 1500 121/75 -- 61 -- 99 %   02/21/23 1430 112/78 -- 64 -- 96 %   02/21/23 1400 120/76 -- 61 -- 95 %   02/21/23 1350 -- -- 65 -- 96 %   02/21/23 1340 126/74 -- -- -- --   02/21/23 1153 129/88 97.9  F (36.6  C) 72 18 99 %        Physical Exam  General: Patient is awake, alert and interactive  Head: The scalp, face, and head appear normal  Eyes: The pupils are equal, round, and reactive to light. Conjunctivae and sclerae are normal  Neck: Normal range of motion.  CV: Regular rate and rhythm. Peripheral pulses including radial pulses are symmetric.   Resp: Lungs are clear without wheezes or rales. No respiratory distress.   GI: Abdomen is soft, no rigidity, guarding, or rebound. No distension. No tenderness to palpation in any quadrant.     MS: reproducible chest wall tenderness. No asymmetric leg swelling, calf or thigh tenderness.    Skin: No rash or lesions noted. Normal capillary refill noted  Neuro: Speech is normal and fluent. Face is symmetric. Moving all extremities.   Psych:  Normal affect.  Appropriate interactions.    Emergency Department Course   ECG  ECG taken at 1157, ECG read at 1209  Normal sinus rhythm. Normal ECG.   No significant change as compared  to prior, dated 9/29/17.  Rate 64 bpm. MN interval 162 ms. QRS duration 84 ms. QT/QTc 382/394 ms. P-R-T axes 61 -7 66.     Repeat ECG  ECG taken at 1401, ECG read at 1404  Normal sinus rhythm. Normal ECG.   No significant change as compared to prior, dated 2/21/23.  Rate 61 bpm. MN interval 166. QRS duration 82. QT/QTc 414/416. P-R-T axes 48 -15 58.    Imaging:  XR Chest 2 Views   Final Result   IMPRESSION: PA and lateral views of the chest were obtained.   Postsurgical changes of cardiac surgery with median sternotomy wires   and surgical clips. Cardiomediastinal silhouette is within normal   limits. No suspicious focal pulmonary opacities. No significant   pleural effusion or pneumothorax.      TORI CHAHAL MD            SYSTEM ID:  [id number]         Report per radiology    Laboratory:  Labs Ordered and Resulted from Time of ED Arrival to Time of ED Departure   BASIC METABOLIC PANEL - Abnormal       Result Value    Sodium 140      Potassium 4.4      Chloride 105      Carbon Dioxide (CO2) 25      Anion Gap 10      Urea Nitrogen 14.2      Creatinine 0.96      Calcium 9.0      Glucose 107 (*)     GFR Estimate 88     CBC WITH PLATELETS AND DIFFERENTIAL - Abnormal    WBC Count 7.6      RBC Count 4.45      Hemoglobin 15.1      Hematocrit 44.0      MCV 99      MCH 33.9 (*)     MCHC 34.3      RDW 11.7      Platelet Count 223      % Neutrophils 65      % Lymphocytes 26      % Monocytes 7      % Eosinophils 1      % Basophils 1      % Immature Granulocytes 0      NRBCs per 100 WBC 0      Absolute Neutrophils 5.0      Absolute Lymphocytes 2.0      Absolute Monocytes 0.5      Absolute Eosinophils 0.1      Absolute Basophils 0.1      Absolute Immature Granulocytes 0.0      Absolute NRBCs 0.0     TROPONIN T, HIGH SENSITIVITY - Normal    Troponin T, High Sensitivity 6     TROPONIN T, HIGH SENSITIVITY - Normal    Troponin T, High Sensitivity 7          Procedures   None    Emergency Department Course & Assessments:        Interventions:  Medications   aspirin (ASA) tablet 325 mg (325 mg Oral $Given 2/21/23 1225)        Independent Interpretation (X-rays, CTs, rhythm strip):  CXR no widened mediastinum    Assessments:  1205 I obtained history and examined the patient as noted above.   1350 I rechecked the patient. He reports having a reoccurrence of mild burning chest pain. I ordered a repeat ECG.     Disposition:  The patient was discharged to home.     Impression & Plan      Medical Decision Making:  Jorgito Serrano is a 67 year old male who presents for evaluation of atypical burning chest pain that began today. Initial in the ED, he is chest pain and symptom free. A broad differential was of course considered. Upon initial evaluation, he is well appearing and no acute distress. EKG shows acute ischemic changes. Initial troponin and two hour repeat are negative. Patient's pain is atypical and reproducible on exam. I feel this is unlikely to represent ACS. CXR is negative as well. I discussed close outpatient follow up and reasons to return to the ED.     Diagnosis:    ICD-10-CM    1. Chest pain, unspecified type  R07.9            Scribe Disclosure:  I, Mellissa Adamson, am serving as a scribe at 1:55 PM on 2/21/2023 to document services personally performed by Nitish Jones MD* based on my observations and the provider's statements to me.   2/21/2023   Nitish Jones MD*        Nitish Jones MD  02/22/23 0252

## 2023-02-22 LAB
ATRIAL RATE - MUSE: 61 BPM
ATRIAL RATE - MUSE: 64 BPM
DIASTOLIC BLOOD PRESSURE - MUSE: NORMAL MMHG
DIASTOLIC BLOOD PRESSURE - MUSE: NORMAL MMHG
INTERPRETATION ECG - MUSE: NORMAL
INTERPRETATION ECG - MUSE: NORMAL
P AXIS - MUSE: 48 DEGREES
P AXIS - MUSE: 61 DEGREES
PR INTERVAL - MUSE: 162 MS
PR INTERVAL - MUSE: 166 MS
QRS DURATION - MUSE: 82 MS
QRS DURATION - MUSE: 84 MS
QT - MUSE: 382 MS
QT - MUSE: 414 MS
QTC - MUSE: 394 MS
QTC - MUSE: 416 MS
R AXIS - MUSE: -15 DEGREES
R AXIS - MUSE: -7 DEGREES
SYSTOLIC BLOOD PRESSURE - MUSE: NORMAL MMHG
SYSTOLIC BLOOD PRESSURE - MUSE: NORMAL MMHG
T AXIS - MUSE: 58 DEGREES
T AXIS - MUSE: 66 DEGREES
VENTRICULAR RATE- MUSE: 61 BPM
VENTRICULAR RATE- MUSE: 64 BPM

## (undated) DEVICE — SYR 20ML SLIP TIP W/O NDL 302831

## (undated) DEVICE — GLOVE PROTEXIS W/NEU-THERA 8.5  2D73TE85

## (undated) DEVICE — SU DERMABOND PRINEO 22CM CLR222US

## (undated) DEVICE — SU FIBERWIRE 5 CCS-1 BLUE  AR-7211

## (undated) DEVICE — LINEN TOWEL PACK X5 5464

## (undated) DEVICE — SU VICRYL 2-0 CP-1 27" UND J266H

## (undated) DEVICE — ESU ELEC BLADE 6" COATED E1450-6

## (undated) DEVICE — PREP CHLORAPREP 26ML TINTED ORANGE  260815

## (undated) DEVICE — BONE CLEANING TIP INTERPULSE FEMORAL CANAL 0210-008-000

## (undated) DEVICE — IMM PILLOW ABDUCT HIP MED 31143061

## (undated) DEVICE — NDL SPINAL 18GA 3.5" 405184

## (undated) DEVICE — ESU GROUND PAD UNIVERSAL W/O CORD

## (undated) DEVICE — PREP SKIN SCRUB TRAY 4461A

## (undated) DEVICE — DRAPE IOBAN INCISE 23X17" 6650EZ

## (undated) DEVICE — SUCTION IRR SYSTEM W/O TIP INTERPULSE HANDPIECE 0210-100-000

## (undated) DEVICE — CATH TRAY FOLEY COUDE SURESTEP 16FR W/URNE MTR STLK A304716A

## (undated) DEVICE — SOL NACL 0.9% IRRIG 1000ML BOTTLE 07138-09

## (undated) DEVICE — GLOVE PROTEXIS POWDER FREE 8.5 ORTHOPEDIC 2D73ET85

## (undated) DEVICE — SU MONOCRYL 3-0 PS-2 27" Y427H

## (undated) DEVICE — GLOVE PROTEXIS BLUE W/NEU-THERA 8.5  2D73EB85

## (undated) DEVICE — NDL 18GA 1.5" 305196

## (undated) DEVICE — SU WND CLOSURE VLOC 180 ABS 0 24" GS-25 VLOCL0436

## (undated) DEVICE — SYR 10ML FINGER CONTROL W/O NDL 309695

## (undated) DEVICE — GLOVE PROTEXIS W/NEU-THERA 8.0  2D73TE80

## (undated) DEVICE — PACK TOTAL HIP W/U DRAPE SOP15HUFSC

## (undated) DEVICE — DRAIN ROUND W/RESERV KIT JACKSON PRATT 10FR 400ML SU130-402D

## (undated) DEVICE — BLADE SAW SAGITTAL STRK 19.5X95X1.27MM 2108-109-000S15

## (undated) DEVICE — SPONGE LAP 18X18" X8435

## (undated) DEVICE — GLOVE PROTEXIS POWDER FREE 8.0 ORTHOPEDIC 2D73ET80

## (undated) DEVICE — MANIFOLD NEPTUNE 4 PORT 700-20

## (undated) RX ORDER — OXYCODONE HCL 10 MG/1
TABLET, FILM COATED, EXTENDED RELEASE ORAL
Status: DISPENSED
Start: 2017-10-04

## (undated) RX ORDER — GLYCOPYRROLATE 0.2 MG/ML
INJECTION, SOLUTION INTRAMUSCULAR; INTRAVENOUS
Status: DISPENSED
Start: 2017-10-04

## (undated) RX ORDER — DEXAMETHASONE SODIUM PHOSPHATE 4 MG/ML
INJECTION, SOLUTION INTRA-ARTICULAR; INTRALESIONAL; INTRAMUSCULAR; INTRAVENOUS; SOFT TISSUE
Status: DISPENSED
Start: 2017-10-04

## (undated) RX ORDER — HYDROMORPHONE HYDROCHLORIDE 1 MG/ML
INJECTION, SOLUTION INTRAMUSCULAR; INTRAVENOUS; SUBCUTANEOUS
Status: DISPENSED
Start: 2017-10-04

## (undated) RX ORDER — ONDANSETRON 2 MG/ML
INJECTION INTRAMUSCULAR; INTRAVENOUS
Status: DISPENSED
Start: 2017-10-04

## (undated) RX ORDER — FENTANYL CITRATE 50 UG/ML
INJECTION, SOLUTION INTRAMUSCULAR; INTRAVENOUS
Status: DISPENSED
Start: 2017-10-04

## (undated) RX ORDER — PROPOFOL 10 MG/ML
INJECTION, EMULSION INTRAVENOUS
Status: DISPENSED
Start: 2017-10-04

## (undated) RX ORDER — LIDOCAINE HYDROCHLORIDE 20 MG/ML
INJECTION, SOLUTION EPIDURAL; INFILTRATION; INTRACAUDAL; PERINEURAL
Status: DISPENSED
Start: 2017-10-04

## (undated) RX ORDER — BUPIVACAINE HYDROCHLORIDE AND EPINEPHRINE 2.5; 5 MG/ML; UG/ML
INJECTION, SOLUTION EPIDURAL; INFILTRATION; INTRACAUDAL; PERINEURAL
Status: DISPENSED
Start: 2017-10-04

## (undated) RX ORDER — CEFAZOLIN SODIUM 2 G/100ML
INJECTION, SOLUTION INTRAVENOUS
Status: DISPENSED
Start: 2017-10-04

## (undated) RX ORDER — BUPIVACAINE HYDROCHLORIDE 2.5 MG/ML
INJECTION, SOLUTION EPIDURAL; INFILTRATION; INTRACAUDAL
Status: DISPENSED
Start: 2017-10-04